# Patient Record
Sex: MALE | Race: WHITE | NOT HISPANIC OR LATINO | Employment: FULL TIME | URBAN - METROPOLITAN AREA
[De-identification: names, ages, dates, MRNs, and addresses within clinical notes are randomized per-mention and may not be internally consistent; named-entity substitution may affect disease eponyms.]

---

## 2018-08-15 ENCOUNTER — HOSPITAL ENCOUNTER (EMERGENCY)
Facility: HOSPITAL | Age: 28
Discharge: HOME | End: 2018-08-15
Attending: EMERGENCY MEDICINE | Admitting: EMERGENCY MEDICINE
Payer: COMMERCIAL

## 2018-08-15 VITALS
SYSTOLIC BLOOD PRESSURE: 119 MMHG | TEMPERATURE: 98.9 F | BODY MASS INDEX: 30.48 KG/M2 | RESPIRATION RATE: 18 BRPM | WEIGHT: 230 LBS | HEIGHT: 73 IN | OXYGEN SATURATION: 95 % | DIASTOLIC BLOOD PRESSURE: 61 MMHG | HEART RATE: 88 BPM

## 2018-08-15 DIAGNOSIS — R59.9 ENLARGED LYMPH NODE: ICD-10-CM

## 2018-08-15 DIAGNOSIS — L05.91 PILONIDAL CYST: Primary | ICD-10-CM

## 2018-08-15 PROCEDURE — 99283 EMERGENCY DEPT VISIT LOW MDM: CPT

## 2018-08-15 RX ORDER — OXYCODONE HYDROCHLORIDE AND ACETAMINOPHEN 5; 325 MG/1; MG/1
1 TABLET ORAL EVERY 4 HOURS PRN
Qty: 15 TABLET | Refills: 0 | Status: SHIPPED | OUTPATIENT
Start: 2018-08-15 | End: 2018-08-25

## 2018-08-15 RX ORDER — CLINDAMYCIN HYDROCHLORIDE 150 MG/1
300 CAPSULE ORAL EVERY 6 HOURS
Qty: 56 CAPSULE | Refills: 0 | Status: SHIPPED | OUTPATIENT
Start: 2018-08-15 | End: 2018-08-22

## 2018-08-15 RX ORDER — CLINDAMYCIN HYDROCHLORIDE 150 MG/1
300 CAPSULE ORAL ONCE
Status: COMPLETED | OUTPATIENT
Start: 2018-08-15 | End: 2018-08-15

## 2018-08-15 RX ORDER — OXYCODONE HYDROCHLORIDE AND ACETAMINOPHEN 5; 325 MG/1; MG/1
1 TABLET ORAL ONCE
Status: COMPLETED | OUTPATIENT
Start: 2018-08-15 | End: 2018-08-15

## 2018-08-15 RX ADMIN — CLINDAMYCIN HYDROCHLORIDE 300 MG: 150 CAPSULE ORAL at 20:15

## 2018-08-15 RX ADMIN — OXYCODONE HYDROCHLORIDE AND ACETAMINOPHEN 1 TABLET: 5; 325 TABLET ORAL at 20:14

## 2018-08-15 NOTE — ED PROVIDER NOTES
History  Chief Complaint   Patient presents with    Hernia     lower right hernia pelvic area sent by urgent care for evaluation  pt also cyst right between buttocks at top of buttocks     Patient referred from a local urgent care for evaluation of possible left inguinal hernia that the patient noticed today  He has pain in the left lateral region  He also complains of a recurring pilonidal cyst that also began to flare today and has started to discharge of pus he substance  He has had this cyst excised before  He denies belly pain  Denies nausea vomiting diarrhea  Denies penile or testicular pain and denies urinary complaints  Denies trauma fall or injury  Patient denies fever  He has normal appetite  None       Past Medical History:   Diagnosis Date    Abdominal pain     C  difficile colitis     2011       Past Surgical History:   Procedure Laterality Date    COLONOSCOPY      COLONOSCOPY N/A 11/29/2016    Procedure: COLONOSCOPY;  Surgeon: Tacos Anguiano MD;  Location: [facility]; Service:        History reviewed  No pertinent family history  I have reviewed and agree with the history as documented  Social History   Substance Use Topics    Smoking status: Former Smoker     Packs/day: 1 00     Types: Cigarettes    Smokeless tobacco: Never Used    Alcohol use Yes      Comment: social        Review of Systems   Constitutional: Negative  Negative for activity change, appetite change, chills, diaphoresis, fatigue and fever  HENT: Negative  Eyes: Negative  Negative for photophobia and visual disturbance  Respiratory: Negative  Negative for shortness of breath  Cardiovascular: Negative  Negative for chest pain, palpitations and leg swelling  Gastrointestinal: Positive for abdominal pain  Negative for diarrhea, nausea and vomiting  Endocrine: Negative  Genitourinary: Negative    Negative for decreased urine volume, discharge, dysuria, flank pain, frequency, genital sores, hematuria, penile pain, penile swelling, scrotal swelling, testicular pain and urgency  Musculoskeletal: Positive for back pain  Negative for neck pain and neck stiffness  Skin: Positive for wound  Negative for rash  Allergic/Immunologic: Negative  Neurological: Negative  Negative for dizziness, tremors, seizures, syncope, facial asymmetry, speech difficulty, weakness, light-headedness, numbness and headaches  Hematological: Negative  Does not bruise/bleed easily  Psychiatric/Behavioral: Negative  Negative for confusion  Physical Exam  Physical Exam   Constitutional: He is oriented to person, place, and time  He appears well-developed and well-nourished  HENT:   Head: Normocephalic and atraumatic  Eyes: EOM are normal  Pupils are equal, round, and reactive to light  Neck: Normal range of motion  Neck supple  Abdominal: Soft  Bowel sounds are normal  He exhibits no distension and no mass  There is no tenderness  There is no rebound and no guarding  No hernia  No reproducible tenderness or peritoneal signs  There is tenderness to the left lateral inguinal region and I feel a small jelly bean sized tender lymph node  No fluctuance or induration  Musculoskeletal: He exhibits tenderness  He exhibits no edema or deformity  Patient with some fluctuance and induration in the lower sacral area where he has scar tissue from prior I and D of pilonidal cyst   There is a distal leak and I can express pus from this  Neurological: He is alert and oriented to person, place, and time  He has normal reflexes  He displays normal reflexes  No cranial nerve deficit or sensory deficit  He exhibits normal muscle tone  Coordination normal    Skin: Skin is warm and dry  Psychiatric: He has a normal mood and affect  His behavior is normal  Thought content normal    Nursing note and vitals reviewed        Vital Signs  ED Triage Vitals   Temperature Pulse Respirations Blood Pressure SpO2   08/15/18 9838 08/15/18 1838 08/15/18 1838 08/15/18 1838 08/15/18 1838   98 9 °F (37 2 °C) 94 18 136/89 100 %      Temp Source Heart Rate Source Patient Position - Orthostatic VS BP Location FiO2 (%)   08/15/18 1838 08/15/18 1838 08/15/18 1838 08/15/18 1838 --   Oral Monitor Standing Left arm       Pain Score       08/15/18 1842       Worst Possible Pain           Vitals:    08/15/18 1838 08/15/18 1842 08/15/18 1945 08/15/18 1948   BP: 136/89 136/89 119/61 119/61   Pulse: 94 77 86 88   Patient Position - Orthostatic VS: Standing Sitting Sitting Lying       Visual Acuity      ED Medications  Medications   oxyCODONE-acetaminophen (PERCOCET) 5-325 mg per tablet 1 tablet (1 tablet Oral Given 8/15/18 2014)   clindamycin (CLEOCIN) capsule 300 mg (300 mg Oral Given 8/15/18 2015)       Diagnostic Studies  Results Reviewed     None                 No orders to display              Procedures  Procedures       Phone Contacts  ED Phone Contact    ED Course  ED Course as of Aug 16 1546   Wed Aug 15, 2018   2006 Patient is stable for discharge  I suspect he has a enlarged lymph node in the left lateral inguinal region that may be secondary to the active pilonidal cyst   Patient did not want me to incise and drain and preferred to wait to see surgery in follow-up  I was able to exude a great deal of pus from the existing opening  I discussed signs and symptoms requiring return to the emergency department  MDM  CritCare Time    Disposition  Final diagnoses:   Pilonidal cyst   Enlarged lymph node     Time reflects when diagnosis was documented in both MDM as applicable and the Disposition within this note     Time User Action Codes Description Comment    8/15/2018  8:09 PM Jayleen Kovacs Add [L05 91] Pilonidal cyst     8/15/2018  8:09 PM Jermaine Ramirez 54 [R59 9] Enlarged lymph node       ED Disposition     ED Disposition Condition Comment    Discharge  Lesly Rojas discharge to home/self care      Condition at discharge: Stable        Follow-up Information     Follow up With Specialties Details Why [address], [place] General Surgery Schedule an appointment as soon as possible for a visit  [address]  [address] [facility]            Discharge Medication List as of 8/15/2018  8:11 PM      START taking these medications    Details   clindamycin (CLEOCIN) 150 mg capsule Take 2 capsules (300 mg total) by mouth every 6 (six) hours for 7 days, Starting Wed 8/15/2018, Until Wed 8/22/2018, Normal      oxyCODONE-acetaminophen (PERCOCET) 5-325 mg per tablet Take 1 tablet by mouth every 4 (four) hours as needed for moderate pain for up to 10 days Max Daily Amount: 6 tablets, Starting Wed 8/15/2018, Until Sat 8/25/2018, Print           No discharge procedures on file      ED Provider  Electronically Signed by           Madisyn Underwood MD  08/16/18 7290

## 2018-08-16 NOTE — DISCHARGE INSTRUCTIONS
Lymphadenopathy   WHAT YOU NEED TO KNOW:   What is lymphadenopathy? Lymphadenopathy is swelling of your lymph nodes  Lymph nodes are small organs that are part of your immune system  Lymph nodes are found throughout your body  They are most easily felt in your neck, under your arms, and near your groin  Lymphadenopathy can occur in one or more areas of your body  What causes lymphadenopathy? Lymphadenopathy is usually caused by a bacterial, viral, or fungal infection  Other causes include autoimmune diseases (such as rheumatoid arthritis or lupus), cancer, and sarcoidosis  What are the signs and symptoms of lymphadenopathy? You may have no symptoms, or you may have any of the following:  · A painful, warm, or red lump under your skin    · More tired than usual    · Skin rash    · Unexplained weight loss    · Enlarged spleen (organ that filters blood)    · Fever or night sweats  How is lymphadenopathy diagnosed? Your healthcare provider will check your lymph node for its size and location  You may need the following tests to help healthcare providers find the cause of your lymphadenopathy:  · Blood tests  may show if you have an infection or other medical condition  · An x-ray, ultrasound, CT, or MRI  of your lymph nodes make be taken  You may be given contrast liquid to help the lymph nodes show up better in the pictures  Tell the healthcare provider if you have ever had an allergic reaction to contrast liquid  Do not enter the MRI room with anything metal  Metal can cause serious injury  Tell the healthcare provider if you have any metal in or on your body  · A lymph node biopsy  is a procedure used to remove a sample of tissue to be tested  Healthcare providers may remove lymph cells through a needle or remove one or more lymph nodes during surgery  How is lymphadenopathy treated? Your symptoms may go away without treatment   Your healthcare provider may need to treat the problem that has caused the lymph nodes to swell  Medicines may be given for infections, cancer, or other causes of your lymphadenopathy  When should I seek immediate care? · The swollen lymph nodes bleed  · You have swollen lymph nodes in your neck that affect your breathing or swallowing  When should I contact my healthcare provider? · You have a fever  · You have a new swollen and painful lymph node  · You have a skin rash  · Your lymph node remains swollen or painful, or it gets bigger  · Your lymph node has red streaks around it, or the skin around the lymph node is red  · You have questions or concerns about your condition or care  CARE AGREEMENT:   You have the right to help plan your care  Learn about your health condition and how it may be treated  Discuss treatment options with your caregivers to decide what care you want to receive  You always have the right to refuse treatment  The above information is an  only  It is not intended as medical advice for individual conditions or treatments  Talk to your doctor, nurse or pharmacist before following any medical regimen to see if it is safe and effective for you  © 2017 [address] [facility] Information is for End User's use only and may not be sold, redistributed or otherwise used for commercial purposes  All illustrations and images included in CareNotes® are the copyrighted property of [employer]  or [employer]  Pilonidal Cyst   WHAT YOU NEED TO KNOW:   What is a pilonidal cyst?  A pilonidal cyst is a small sac under the skin  Pilonidal cysts may become infected and cause an abscess (collection of pus)  What causes a pilonidal cyst?  Pilonidal cysts may be caused by an ingrown hair  A hair may become ingrown if it rubs against your skin  The friction can cause hair to dig into the skin and get trapped there     What are the signs and symptoms of a pilonidal cyst?  A pilonidal cyst may look like a small hole or dimple in the center of your lower back  It is usually located right above your buttocks  The pilonidal cyst may feel tender or painful after doing physical activities or sitting for a long period of time  The cyst may feel hot, and be red and swollen if it becomes infected  Pus may also drain from the cyst    How is a pilonidal cyst diagnosed and treated? Your healthcare provider will examine you and ask about your symptoms  You may not need any treatment  A pilonidal cyst may go away on its own  If the cyst becomes infected, the pus may need to be drained  Your healthcare provider may make a small incision to drain the pus and pack it with gauze  Your cyst may need to be surgically removed if it becomes infected often  How can I help prevent an infection? · Shave around the cyst   This will prevent hairs from entering the cyst  Your healthcare provider may recommend laser hair removal      · Clean the cyst area as directed  Your healthcare provider may recommend that you use a mild soap and rinse it well  · Do not sit for long periods of time  This may cause the cyst to get irritated  When should I contact my healthcare provider? · You have a fever  · Your cyst is red and swollen  · Your cyst has pus draining from it  · You have questions or concerns about your condition or care  CARE AGREEMENT:   You have the right to help plan your care  Learn about your health condition and how it may be treated  Discuss treatment options with your caregivers to decide what care you want to receive  You always have the right to refuse treatment  The above information is an  only  It is not intended as medical advice for individual conditions or treatments  Talk to your doctor, nurse or pharmacist before following any medical regimen to see if it is safe and effective for you    © 2017 [employer] Information is for End User's use only and may not be sold, redistributed or otherwise used for commercial purposes  All illustrations and images included in CareNotes® are the copyrighted property of A [employer]  or [employer]

## 2018-08-28 ENCOUNTER — ANESTHESIA (OUTPATIENT)
Dept: PERIOP | Facility: HOSPITAL | Age: 28
End: 2018-08-28
Payer: COMMERCIAL

## 2018-08-28 ENCOUNTER — HOSPITAL ENCOUNTER (OUTPATIENT)
Facility: HOSPITAL | Age: 28
Setting detail: OUTPATIENT SURGERY
Discharge: HOME | End: 2018-08-28
Attending: SURGERY | Admitting: SURGERY
Payer: COMMERCIAL

## 2018-08-28 ENCOUNTER — ANESTHESIA EVENT (OUTPATIENT)
Dept: PERIOP | Facility: HOSPITAL | Age: 28
End: 2018-08-28
Payer: COMMERCIAL

## 2018-08-28 VITALS
WEIGHT: 230 LBS | SYSTOLIC BLOOD PRESSURE: 119 MMHG | BODY MASS INDEX: 30.48 KG/M2 | HEART RATE: 69 BPM | HEIGHT: 73 IN | DIASTOLIC BLOOD PRESSURE: 75 MMHG | OXYGEN SATURATION: 94 % | TEMPERATURE: 100 F | RESPIRATION RATE: 18 BRPM

## 2018-08-28 DIAGNOSIS — L05.91 PILONIDAL CYST WITHOUT ABSCESS: ICD-10-CM

## 2018-08-28 PROCEDURE — 88304 TISSUE EXAM BY PATHOLOGIST: CPT | Performed by: PATHOLOGY

## 2018-08-28 RX ORDER — MIDAZOLAM HYDROCHLORIDE 1 MG/ML
INJECTION INTRAMUSCULAR; INTRAVENOUS AS NEEDED
Status: DISCONTINUED | OUTPATIENT
Start: 2018-08-28 | End: 2018-08-28 | Stop reason: SURG

## 2018-08-28 RX ORDER — SODIUM CHLORIDE, SODIUM LACTATE, POTASSIUM CHLORIDE, CALCIUM CHLORIDE 600; 310; 30; 20 MG/100ML; MG/100ML; MG/100ML; MG/100ML
125 INJECTION, SOLUTION INTRAVENOUS CONTINUOUS
Status: DISCONTINUED | OUTPATIENT
Start: 2018-08-28 | End: 2018-08-28 | Stop reason: HOSPADM

## 2018-08-28 RX ORDER — OXYCODONE HYDROCHLORIDE AND ACETAMINOPHEN 5; 325 MG/1; MG/1
2 TABLET ORAL EVERY 4 HOURS PRN
Qty: 28 TABLET | Refills: 0 | Status: SHIPPED | OUTPATIENT
Start: 2018-08-28

## 2018-08-28 RX ORDER — MORPHINE SULFATE 4 MG/ML
4 INJECTION, SOLUTION INTRAMUSCULAR; INTRAVENOUS
Status: DISCONTINUED | OUTPATIENT
Start: 2018-08-28 | End: 2018-08-28 | Stop reason: HOSPADM

## 2018-08-28 RX ORDER — FENTANYL CITRATE/PF 50 MCG/ML
25 SYRINGE (ML) INJECTION
Status: DISCONTINUED | OUTPATIENT
Start: 2018-08-28 | End: 2018-08-28 | Stop reason: HOSPADM

## 2018-08-28 RX ORDER — GLYCOPYRROLATE 0.2 MG/ML
INJECTION INTRAMUSCULAR; INTRAVENOUS AS NEEDED
Status: DISCONTINUED | OUTPATIENT
Start: 2018-08-28 | End: 2018-08-28 | Stop reason: SURG

## 2018-08-28 RX ORDER — ONDANSETRON 2 MG/ML
4 INJECTION INTRAMUSCULAR; INTRAVENOUS EVERY 4 HOURS PRN
Status: DISCONTINUED | OUTPATIENT
Start: 2018-08-28 | End: 2018-08-28 | Stop reason: HOSPADM

## 2018-08-28 RX ORDER — SUCCINYLCHOLINE/SOD CL,ISO/PF 100 MG/5ML
SYRINGE (ML) INTRAVENOUS AS NEEDED
Status: DISCONTINUED | OUTPATIENT
Start: 2018-08-28 | End: 2018-08-28 | Stop reason: SURG

## 2018-08-28 RX ORDER — PROPOFOL 10 MG/ML
INJECTION, EMULSION INTRAVENOUS AS NEEDED
Status: DISCONTINUED | OUTPATIENT
Start: 2018-08-28 | End: 2018-08-28 | Stop reason: SURG

## 2018-08-28 RX ORDER — BUPIVACAINE HYDROCHLORIDE AND EPINEPHRINE 2.5; 5 MG/ML; UG/ML
INJECTION, SOLUTION EPIDURAL; INFILTRATION; INTRACAUDAL; PERINEURAL AS NEEDED
Status: DISCONTINUED | OUTPATIENT
Start: 2018-08-28 | End: 2018-08-28 | Stop reason: HOSPADM

## 2018-08-28 RX ORDER — ONDANSETRON 2 MG/ML
INJECTION INTRAMUSCULAR; INTRAVENOUS AS NEEDED
Status: DISCONTINUED | OUTPATIENT
Start: 2018-08-28 | End: 2018-08-28 | Stop reason: SURG

## 2018-08-28 RX ORDER — KETOROLAC TROMETHAMINE 30 MG/ML
INJECTION, SOLUTION INTRAMUSCULAR; INTRAVENOUS AS NEEDED
Status: DISCONTINUED | OUTPATIENT
Start: 2018-08-28 | End: 2018-08-28 | Stop reason: SURG

## 2018-08-28 RX ORDER — CEPHALEXIN 500 MG/1
500 CAPSULE ORAL EVERY 8 HOURS SCHEDULED
COMMUNITY

## 2018-08-28 RX ORDER — ONDANSETRON 2 MG/ML
4 INJECTION INTRAMUSCULAR; INTRAVENOUS ONCE AS NEEDED
Status: DISCONTINUED | OUTPATIENT
Start: 2018-08-28 | End: 2018-08-28 | Stop reason: HOSPADM

## 2018-08-28 RX ORDER — OXYCODONE HYDROCHLORIDE AND ACETAMINOPHEN 5; 325 MG/1; MG/1
2 TABLET ORAL EVERY 4 HOURS PRN
Status: DISCONTINUED | OUTPATIENT
Start: 2018-08-28 | End: 2018-08-28 | Stop reason: HOSPADM

## 2018-08-28 RX ORDER — FENTANYL CITRATE 50 UG/ML
INJECTION, SOLUTION INTRAMUSCULAR; INTRAVENOUS AS NEEDED
Status: DISCONTINUED | OUTPATIENT
Start: 2018-08-28 | End: 2018-08-28 | Stop reason: SURG

## 2018-08-28 RX ADMIN — PROPOFOL 200 MG: 10 INJECTION, EMULSION INTRAVENOUS at 10:54

## 2018-08-28 RX ADMIN — DEXAMETHASONE SODIUM PHOSPHATE 10 MG: 10 INJECTION INTRAMUSCULAR; INTRAVENOUS at 10:54

## 2018-08-28 RX ADMIN — SODIUM CHLORIDE, SODIUM LACTATE, POTASSIUM CHLORIDE, AND CALCIUM CHLORIDE 125 ML/HR: .6; .31; .03; .02 INJECTION, SOLUTION INTRAVENOUS at 09:15

## 2018-08-28 RX ADMIN — SODIUM CHLORIDE, SODIUM LACTATE, POTASSIUM CHLORIDE, AND CALCIUM CHLORIDE: .6; .31; .03; .02 INJECTION, SOLUTION INTRAVENOUS at 10:13

## 2018-08-28 RX ADMIN — PROPOFOL 50 MG: 10 INJECTION, EMULSION INTRAVENOUS at 11:00

## 2018-08-28 RX ADMIN — FENTANYL CITRATE 25 MCG: 50 INJECTION INTRAMUSCULAR; INTRAVENOUS at 12:30

## 2018-08-28 RX ADMIN — GLYCOPYRROLATE 0.2 MG: 0.2 INJECTION, SOLUTION INTRAMUSCULAR; INTRAVENOUS at 10:52

## 2018-08-28 RX ADMIN — ONDANSETRON 4 MG: 2 INJECTION INTRAMUSCULAR; INTRAVENOUS at 10:54

## 2018-08-28 RX ADMIN — FENTANYL CITRATE 25 MCG: 50 INJECTION INTRAMUSCULAR; INTRAVENOUS at 12:24

## 2018-08-28 RX ADMIN — CEFAZOLIN SODIUM 2000 MG: 2 SOLUTION INTRAVENOUS at 11:02

## 2018-08-28 RX ADMIN — FENTANYL CITRATE 50 MCG: 50 INJECTION, SOLUTION INTRAMUSCULAR; INTRAVENOUS at 10:51

## 2018-08-28 RX ADMIN — FENTANYL CITRATE 50 MCG: 50 INJECTION, SOLUTION INTRAMUSCULAR; INTRAVENOUS at 11:00

## 2018-08-28 RX ADMIN — Medication 100 MG: at 10:54

## 2018-08-28 RX ADMIN — MIDAZOLAM 2 MG: 1 INJECTION INTRAMUSCULAR; INTRAVENOUS at 10:51

## 2018-08-28 RX ADMIN — OXYCODONE HYDROCHLORIDE AND ACETAMINOPHEN 2 TABLET: 5; 325 TABLET ORAL at 13:01

## 2018-08-28 RX ADMIN — FENTANYL CITRATE 25 MCG: 50 INJECTION INTRAMUSCULAR; INTRAVENOUS at 12:18

## 2018-08-28 RX ADMIN — KETOROLAC TROMETHAMINE 30 MG: 30 INJECTION, SOLUTION INTRAMUSCULAR at 11:27

## 2018-08-28 RX ADMIN — LIDOCAINE HYDROCHLORIDE 50 MG: 20 INJECTION, SOLUTION INTRAVENOUS at 10:54

## 2018-08-28 NOTE — ANESTHESIA PREPROCEDURE EVALUATION
Review of Systems/Medical History      No history of anesthetic complications     Cardiovascular  Negative cardio ROS Exercise tolerance (METS): >4,     Pulmonary  Negative pulmonary ROS        GI/Hepatic            Endo/Other    Obesity    GYN       Hematology   Musculoskeletal       Neurology   Psychology           Physical Exam    Airway    Mallampati score: I  TM Distance: >3 FB  Neck ROM: full     Dental       Cardiovascular  Comment: Negative ROS, Cardiovascular exam normal    Pulmonary  Breath sounds clear to auscultation,     Other Findings        Anesthesia Plan  ASA Score- 1     Anesthesia Type- general with ASA Monitors  Additional Monitors:   Airway Plan: ETT  Plan Factors-    Induction- intravenous  Postoperative Plan- Plan for postoperative opioid use  Informed Consent- Anesthetic plan and risks discussed with patient  I personally reviewed this patient with the CRNA  Discussed and agreed on the Anesthesia Plan with the CRNA  James Trevizo

## 2018-08-28 NOTE — OP NOTE
OPERATIVE REPORT  PATIENT NAME: Elbert Narayanan    :  1990  MRN: [id number]  Pt Location:  OR ROOM 05    SURGERY DATE: 2018    Surgeon(s) and Role:     * Juarez Gómez DO - Primary     * Wendy Ovalle MD - Assisting    Preop Diagnosis:  Pilonidal cyst without abscess [L05 91]    Post-Op Diagnosis Codes:     * Pilonidal cyst without abscess [L05 ]    Procedure(s) (LRB):  EXCISION PILONIDAL CYST (N/A)    Specimen(s):  ID Type Source Tests Collected by Time Destination   1 : pilonidal cyst Tissue Pilonidal Cyst/Sinus TISSUE EXAM Juarez Gómez DO 2018 1113        Estimated Blood Loss:   Minimal    Drains:       Anesthesia Type:   General/local    Operative Indications:  Pilonidal cyst without abscess [L05 ]      Operative Findings:    Review of Systems/Medical History  No history of anesthetic complications     Cardiovascular  Negative cardio ROS Exercise tolerance (METS): >4,   Pulmonary  Negative pulmonary ROS       GI/Hepatic          Endo/Other  Obesity     GYN      Hematology Musculoskeletal      Neurology Psychology      Height 73 in weight 104 kg/230 lb BMI 30  ASA 2  Wound class 2  Complications:   None    Procedure and Technique:  Patient was brought into the operative suite on the stretcher  He was placed under general anesthesia on the stretcher after placement of a thrombotic pumps  Once under anesthesia was then placed prone on the OR table  He was given perioperative antibiotics  Buttocks were taped apart to expose pilonidal cyst   This areas prepped and draped in a sterile fashion with Betadine  Time-out was performed  Local was instilled the skin surrounding tissues of his pilonidal cyst opening and a granular drainage tract  A crescent shaped elliptical incision was made to encompass both the opening as well as his pilonidal cyst area  This was taken down to the deeper subcutaneous tissues just above the sacrum  It was removed in its entirety    Did undermine towards the right side of the incision to help provide closure  Irrigation is carried out local was instilled deeper subcutaneous tissues were reapproximated 0 Vicryl suture  Superficial subcutaneous tissues were reapproximated in 2 0 Vicryl  Skin was closed using 3 0 nylon in a mattress fashion  Subcuticular closure of the skin was then performed with 4 Monocryl  Wound was washed and dried  Sterile histoacryl was applied  Once dry did place dressings to help provide padding  He was awakened in the operating returned to the recovery area in stable condition having tolerated procedure well     I was present for the entire procedure    Patient Disposition:  PACU     SIGNATURE: Alva Roque DO  DATE: August 28, 2018  TIME: 11:48 AM

## 2018-08-28 NOTE — DISCHARGE INSTRUCTIONS
Ice as needed to wound    May shower or bathe daily  Encourage ambulation  Okay to climb stairs    Recommend no driving for 5 days    May change dressing as needed     Change dressing daily if he noticed drainage  Call Dr Khanh Gibbons with questions or concerns    [phone]    Call office to be seen in approximately 2 weeks

## 2021-10-16 NOTE — ANESTHESIA POSTPROCEDURE EVALUATION
Post-Op Assessment Note      CV Status:  Stable    Mental Status:  Alert and awake    Hydration Status:  Euvolemic    PONV Controlled:  Controlled    Airway Patency:  Patent    Post Op Vitals Reviewed: Yes          Staff: CRNA           BP   161/69   Temp   98 6   Pulse  95   Resp   16   SpO2   94 Fellow

## 2024-05-14 ENCOUNTER — HOSPITAL ENCOUNTER (EMERGENCY)
Facility: HOSPITAL | Age: 34
Discharge: HOME | End: 2024-05-14
Attending: EMERGENCY MEDICINE
Payer: COMMERCIAL

## 2024-05-14 ENCOUNTER — APPOINTMENT (EMERGENCY)
Dept: CT IMAGING | Facility: HOSPITAL | Age: 34
End: 2024-05-14
Payer: COMMERCIAL

## 2024-05-14 VITALS
DIASTOLIC BLOOD PRESSURE: 111 MMHG | TEMPERATURE: 98 F | RESPIRATION RATE: 18 BRPM | HEART RATE: 85 BPM | SYSTOLIC BLOOD PRESSURE: 158 MMHG | OXYGEN SATURATION: 96 %

## 2024-05-14 DIAGNOSIS — R93.0 ABNORMAL CT OF THE HEAD: ICD-10-CM

## 2024-05-14 DIAGNOSIS — R51.9 ACUTE HEADACHE: Primary | ICD-10-CM

## 2024-05-14 LAB
ANION GAP SERPL CALCULATED.3IONS-SCNC: 4 MMOL/L (ref 4–13)
BUN SERPL-MCNC: 11 MG/DL (ref 5–25)
CALCIUM SERPL-MCNC: 9.2 MG/DL (ref 8.4–10.2)
CHLORIDE SERPL-SCNC: 106 MMOL/L (ref 96–108)
CO2 SERPL-SCNC: 29 MMOL/L (ref 21–32)
CREAT SERPL-MCNC: 0.91 MG/DL (ref 0.6–1.3)
GFR SERPL CREATININE-BSD FRML MDRD: 108 ML/MIN/1.73SQ M
GLUCOSE SERPL-MCNC: 89 MG/DL (ref 65–140)
POTASSIUM SERPL-SCNC: 4.3 MMOL/L (ref 3.5–5.3)
SODIUM SERPL-SCNC: 139 MMOL/L (ref 135–147)

## 2024-05-14 PROCEDURE — 80048 BASIC METABOLIC PNL TOTAL CA: CPT | Performed by: EMERGENCY MEDICINE

## 2024-05-14 PROCEDURE — 96375 TX/PRO/DX INJ NEW DRUG ADDON: CPT

## 2024-05-14 PROCEDURE — 36415 COLL VENOUS BLD VENIPUNCTURE: CPT | Performed by: EMERGENCY MEDICINE

## 2024-05-14 PROCEDURE — 70498 CT ANGIOGRAPHY NECK: CPT

## 2024-05-14 PROCEDURE — 99285 EMERGENCY DEPT VISIT HI MDM: CPT | Performed by: EMERGENCY MEDICINE

## 2024-05-14 PROCEDURE — 96365 THER/PROPH/DIAG IV INF INIT: CPT

## 2024-05-14 PROCEDURE — 70496 CT ANGIOGRAPHY HEAD: CPT

## 2024-05-14 PROCEDURE — 99284 EMERGENCY DEPT VISIT MOD MDM: CPT

## 2024-05-14 RX ORDER — DIPHENHYDRAMINE HYDROCHLORIDE 50 MG/ML
25 INJECTION INTRAMUSCULAR; INTRAVENOUS ONCE
Status: COMPLETED | OUTPATIENT
Start: 2024-05-14 | End: 2024-05-14

## 2024-05-14 RX ORDER — KETOROLAC TROMETHAMINE 30 MG/ML
15 INJECTION, SOLUTION INTRAMUSCULAR; INTRAVENOUS ONCE
Status: COMPLETED | OUTPATIENT
Start: 2024-05-14 | End: 2024-05-14

## 2024-05-14 RX ORDER — METOCLOPRAMIDE HYDROCHLORIDE 5 MG/ML
10 INJECTION INTRAMUSCULAR; INTRAVENOUS ONCE
Status: COMPLETED | OUTPATIENT
Start: 2024-05-14 | End: 2024-05-14

## 2024-05-14 RX ORDER — ACETAMINOPHEN 325 MG/1
975 TABLET ORAL ONCE
Status: COMPLETED | OUTPATIENT
Start: 2024-05-14 | End: 2024-05-14

## 2024-05-14 RX ORDER — MAGNESIUM SULFATE HEPTAHYDRATE 40 MG/ML
2 INJECTION, SOLUTION INTRAVENOUS ONCE
Status: COMPLETED | OUTPATIENT
Start: 2024-05-14 | End: 2024-05-14

## 2024-05-14 RX ADMIN — ACETAMINOPHEN 975 MG: 325 TABLET, FILM COATED ORAL at 13:47

## 2024-05-14 RX ADMIN — SODIUM CHLORIDE 1000 ML: 0.9 INJECTION, SOLUTION INTRAVENOUS at 13:51

## 2024-05-14 RX ADMIN — DIPHENHYDRAMINE HYDROCHLORIDE 25 MG: 50 INJECTION, SOLUTION INTRAMUSCULAR; INTRAVENOUS at 13:47

## 2024-05-14 RX ADMIN — METOCLOPRAMIDE 10 MG: 5 INJECTION, SOLUTION INTRAMUSCULAR; INTRAVENOUS at 13:50

## 2024-05-14 RX ADMIN — KETOROLAC TROMETHAMINE 15 MG: 30 INJECTION, SOLUTION INTRAMUSCULAR; INTRAVENOUS at 13:49

## 2024-05-14 RX ADMIN — IOHEXOL 85 ML: 350 INJECTION, SOLUTION INTRAVENOUS at 14:25

## 2024-05-14 RX ADMIN — MAGNESIUM SULFATE HEPTAHYDRATE 2 G: 40 INJECTION, SOLUTION INTRAVENOUS at 14:06

## 2024-05-14 NOTE — Clinical Note
Vega Troy was seen and treated in our emergency department on 5/14/2024.                Diagnosis: headache    Vega  may return to work on return date.    He may return on this date: 05/15/2024         If you have any questions or concerns, please don't hesitate to call.      Nicola Busch MD    ______________________________           _______________          _______________  Hospital Representative                              Date                                Time

## 2024-05-14 NOTE — ED PROVIDER NOTES
"History  Chief Complaint   Patient presents with    Headache     Patient reports straining to make a bowel movement this past Friday and felt \"a pop\" in their head and was immediately followed by a frontal migraine. Patient reports no hx of migraines, reports pain has been intermittent all weekend with increase in pain again last night. Family denies any slurred speech or facial drooping. [place] scale (-).      34-year-old male no significant reported past history presenting with headache.  Patient reports he was having some abdominal pain on Friday went to have a bowel movement.  While straining to have a bowel movement he felt a popping sensation in the front of his head associated with sharp significant headache.  Reports intermittent nausea without vomiting.  Reports some photophobia without visual changes such as blurred vision or double vision.  Reports waxing waning headache since that time.  Here today due to worsening last night.  Not maximal in onset not the worst headache of his life.  Denies any chest pain shortness of breath.  Denies any neurological changes such as motor or sensory deficits.  Denies any other complaints.  Chart reviewed.    Past Medical History:  No date: Abdominal pain  No date: C. difficile colitis      Comment:  2011  No date: Pilonidal cyst  Family History: non-contributory  Social History          Prior to Admission Medications   Prescriptions Last Dose Informant Patient Reported? Taking?   cephalexin (KEFLEX) 500 mg capsule   Yes No   Sig: Take 500 mg by mouth every 8 (eight) hours   oxyCODONE-acetaminophen (PERCOCET) 5-325 mg per tablet   No No   Sig: Take 2 tablets by mouth every 4 (four) hours as needed for moderate pain for up to 20 doses Max Daily Amount: 12 tablets      Facility-Administered Medications: None       Past Medical History:   Diagnosis Date    Abdominal pain     C. difficile colitis     2011    Pilonidal cyst        Past Surgical History:   Procedure " Laterality Date    COLONOSCOPY N/A 11/29/2016    Procedure: COLONOSCOPY;  Surgeon: Mono Engel MD;  Location: [facility];  Service:     OCCULT BLOOD, FECAL IMMUNOCHEMICAL (HISTORICAL)      MI EXCISION PILONIDAL CYST/SINUS SIMPLE N/A 8/28/2018    Procedure: EXCISION PILONIDAL CYST;  Surgeon: Daniel Aparicio DO;  Location: [facility];  Service: General       Family History   Problem Relation Age of Onset    Asthma Mother     Arthritis Mother     Asthma Paternal Grandmother     Arthritis Paternal Grandmother     Alzheimer's disease Family      I have reviewed and agree with the history as documented.    E-Cigarette/Vaping    E-Cigarette Use Never User      E-Cigarette/Vaping Substances     Social History     Tobacco Use    Smoking status: Former     Current packs/day: 1.00     Types: Cigarettes    Smokeless tobacco: Never   Vaping Use    Vaping status: Never Used   Substance Use Topics    Alcohol use: Yes     Comment: social    Drug use: No       Review of Systems   Constitutional:  Negative for appetite change, chills, diaphoresis, fever and unexpected weight change.   HENT:  Negative for congestion and rhinorrhea.    Eyes:  Negative for photophobia and visual disturbance.   Respiratory:  Negative for cough, chest tightness and shortness of breath.    Cardiovascular:  Negative for chest pain, palpitations and leg swelling.   Gastrointestinal:  Negative for abdominal distention, abdominal pain, blood in stool, constipation, diarrhea, nausea and vomiting.   Genitourinary:  Negative for dysuria and hematuria.   Musculoskeletal:  Negative for back pain, joint swelling, neck pain and neck stiffness.   Skin:  Negative for color change, pallor, rash and wound.   Neurological:  Positive for headaches. Negative for dizziness, syncope, weakness and light-headedness.   Psychiatric/Behavioral:  Negative for agitation.    All other systems reviewed and are negative.      Physical Exam  Physical Exam  Vitals and nursing note  reviewed.   Constitutional:       General: He is not in acute distress.     Appearance: Normal appearance. He is well-developed. He is not ill-appearing, toxic-appearing or diaphoretic.   HENT:      Head: Normocephalic and atraumatic.      Nose: Nose normal. No congestion or rhinorrhea.      Mouth/Throat:      Mouth: Mucous membranes are moist.      Pharynx: Oropharynx is clear. No oropharyngeal exudate or posterior oropharyngeal erythema.   Eyes:      General: No scleral icterus.        Right eye: No discharge.         Left eye: No discharge.      Extraocular Movements: Extraocular movements intact.      Conjunctiva/sclera: Conjunctivae normal.      Pupils: Pupils are equal, round, and reactive to light.   Neck:      Vascular: No JVD.      Trachea: No tracheal deviation.      Comments: Supple. Normal range of motion.   Cardiovascular:      Rate and Rhythm: Normal rate and regular rhythm.      Heart sounds: Normal heart sounds. No murmur heard.     No friction rub. No gallop.      Comments: Normal rate and regular rhythm  Pulmonary:      Effort: Pulmonary effort is normal. No respiratory distress.      Breath sounds: Normal breath sounds. No stridor. No wheezing or rales.      Comments: Clear to auscultation bilaterally  Chest:      Chest wall: No tenderness.   Abdominal:      General: Bowel sounds are normal. There is no distension.      Palpations: Abdomen is soft.      Tenderness: There is no abdominal tenderness. There is no right CVA tenderness, left CVA tenderness, guarding or rebound.      Comments: Soft, nontender, nondistended.  Normal bowel sounds throughout   Musculoskeletal:         General: No swelling, tenderness, deformity or signs of injury. Normal range of motion.      Cervical back: Normal range of motion and neck supple. No rigidity or tenderness. No muscular tenderness.      Right lower leg: No edema.      Left lower leg: No edema.   Lymphadenopathy:      Cervical: No cervical adenopathy.    Skin:     General: Skin is warm and dry.      Coloration: Skin is not pale.      Findings: No erythema or rash.   Neurological:      General: No focal deficit present.      Mental Status: He is alert and oriented to person, place, and time. Mental status is at baseline.      Cranial Nerves: No cranial nerve deficit.      Sensory: No sensory deficit.      Motor: No weakness or abnormal muscle tone.      Coordination: Coordination normal.      Gait: Gait normal.      Comments: A&Ox3 to person, place, and time. CN 2-12 intact. Strength 5/5 throughout. Sensation intact throughout. Cerebellar exam including gait intact.   Psychiatric:         Behavior: Behavior normal.         Thought Content: Thought content normal.         Vital Signs  ED Triage Vitals [05/14/24 1126]   Temperature Pulse Respirations Blood Pressure SpO2   98 °F (36.7 °C) 85 18 (!) 158/111 96 %      Temp Source Heart Rate Source Patient Position - Orthostatic VS BP Location FiO2 (%)   Temporal Monitor Sitting Left arm --      Pain Score       3           Vitals:    05/14/24 1126   BP: (!) 158/111   Pulse: 85   Patient Position - Orthostatic VS: Sitting         Visual Acuity  Visual Acuity      Flowsheet Row Most Recent Value   L Pupil Size (mm) 3   R Pupil Size (mm) 3            ED Medications  Medications   sodium chloride 0.9 % bolus 1,000 mL (0 mL Intravenous Stopped 5/14/24 1515)   acetaminophen (TYLENOL) tablet 975 mg (975 mg Oral Given 5/14/24 1347)   ketorolac (TORADOL) injection 15 mg (15 mg Intravenous Given 5/14/24 1349)   metoclopramide (REGLAN) injection 10 mg (10 mg Intravenous Given 5/14/24 1350)   diphenhydrAMINE (BENADRYL) injection 25 mg (25 mg Intravenous Given 5/14/24 1347)   magnesium sulfate 2 g/50 mL IVPB (premix) 2 g (0 g Intravenous Stopped 5/14/24 1450)   iohexol (OMNIPAQUE) 350 MG/ML injection (MULTI-DOSE) 85 mL (85 mL Intravenous Given 5/14/24 1425)       Diagnostic Studies  Results Reviewed       Procedure Component Value  Units Date/Time    Basic metabolic panel [[id number]] Collected: 05/14/24 1343    Lab Status: Final result Specimen: Blood from Arm, Right Updated: 05/14/24 1401     Sodium 139 mmol/L      Potassium 4.3 mmol/L      Chloride 106 mmol/L      CO2 29 mmol/L      ANION GAP 4 mmol/L      BUN 11 mg/dL      Creatinine 0.91 mg/dL      Glucose 89 mg/dL      Calcium 9.2 mg/dL      eGFR 108 ml/min/1.73sq m     Narrative:      National Kid[employer] Disease Foundation guidelines for Chronic Kidney Disease (CKD):     Stage 1 with normal or high GFR (GFR > 90 mL/min/1.73 square meters)    Stage 2 Mild CKD (GFR = 60-89 mL/min/1.73 square meters)    Stage 3A Moderate CKD (GFR = 45-59 mL/min/1.73 square meters)    Stage 3B Moderate CKD (GFR = 30-44 mL/min/1.73 square meters)    Stage 4 Severe CKD (GFR = 15-29 mL/min/1.73 square meters)    Stage 5 End Stage CKD (GFR <15 mL/min/1.73 square meters)  Note: GFR calculation is accurate only with a steady state creatinine                   CTA head and neck with and without contrast   Final Result by Ulisses Cardenas MD (05/14 1522)      CT head:   -Hypodensity within the right thalamus (2:23), nonspecific and may represent age-indeterminate infarct. Recommend MRI of the brain for further evaluation.      CTA head:   -No large vessel occlusion, high-grade stenosis.   -Fusiform aneurysmal dilatation of the distal right A2 segment measuring 2 mm in greatest axial dimension. Recommend consultation with the [facility] of [facility] at [phone].   -Short segment multifocal mild luminal narrowing of the distal right A2 segment, nonspecific. Differential includes vasculitis, vasospasm among other etiologies.      CTA neck:   -No high-grade stenosis, dissection or aneurysm.      Other:   -Bilateral level 2 lymphadenopathy measuring up to 1 cm. Findings may be reactive. Clinical correlation advised.         The study was marked in EPIC for immediate  notification.                     Workstation performed: [id number]                    Procedures  Procedures         ED Course                                             Medical Decision Making  34-year-old male no significant reported past history presenting with headache.  Headache after straining for bowel movement.  Intact neuroexam.  Plan for CT imaging.  Symptom management with oral and IV pain and nausea medication plus fluids.  Reassess.    Labs interpreted by me without significant acute process.  Headache resolved.  CT no significant acute process.  Notable for small aneurysm and nonspecific colonic lesion.  Advised patient of results and need for follow-up imaging including MRI.  Headache precautions. Discussed results and recommendations. Advised follow up PCP. Medication recommendations. Given instructions and return precautions. Patient/family at bedside acknowledged understanding of all written and verbal instructions and return precautions. Discharged.     Amount and/or Complexity of Data Reviewed  Labs: ordered.  Radiology: ordered.    Risk  OTC drugs.  Prescription drug management.             Disposition  Final diagnoses:   Acute headache   Abnormal CT of the head     Time reflects when diagnosis was documented in both MDM as applicable and the Disposition within this note       Time User Action Codes Description Comment    5/14/2024  2:38 PM Nicola Busch Add [R51.9] Acute headache     5/14/2024  3:27 PM Nicola Busch Add [R93.0] Abnormal CT of the head           ED Disposition       ED Disposition   Discharge    Condition   Stable    Date/Time   Tue May 14, 2024  3:26 PM    Comment   [place] discharge to home/self care.                   Follow-up Information       Follow up With Specialties Details Why Contact Info Additional Information    Richar Maclolm, DO Family Medicine Schedule an appointment as soon as possible for a visit in 1 week  [address] [facility]  [place]  [address]  [phone]       Phil Jurado MD Neurosurgery, Radiology Schedule an appointment as soon as possible for a visit in 1 week  [address] [place] [id number]  [phone]       [facility] Neurosurgery Schedule an appointment as soon as possible for a visit in 1 week  [address] [place] [id number] [phone] [facility], [address] [place] [address], [id number] [phone]            Patient's Medications   Discharge Prescriptions    No medications on file       No discharge procedures on file.    PDMP Review       None            ED Provider  Electronically Signed by             Nicola Busch MD  05/14/24 6803

## 2024-05-14 NOTE — DISCHARGE INSTRUCTIONS
Please follow up PCP. Recommend tylenol 650 mg and ibuprofen 600 mg every 6 hours as needed for pain. Please return for severe chest pain, significant shortness of breath, severely worsening symptoms, or any other concerning signs or symptoms. Please refer to the following documents for additional instructions and return precautions.     -Hypodensity within the right thalamus (2:23), nonspecific and may represent age-indeterminate infarct. Recommend MRI of the brain for further evaluation.    -Fusiform aneurysmal dilatation of the distal right A2 segment measuring 2 mm in greatest axial dimension.  Recommend follow-up neurovascular center.   Detail Level: Detailed Render Risk Assessment In Note?: yes Additional Notes: Plan for 1 syringe [place]ollure

## 2024-05-15 ENCOUNTER — TELEPHONE (OUTPATIENT)
Dept: NEUROSURGERY | Facility: CLINIC | Age: 34
End: 2024-05-15

## 2024-05-15 NOTE — TELEPHONE ENCOUNTER
5/15/24 - PT CALLED TO SCHED APPT ABNORMAL CTA HEAD & NECK 5/14/24 SL    REF BY [facility] - ACUTE HEADACHE     INFORMED PT AN  WILL BE IN CONTACT HIM EXPLAINED PROCESS

## 2024-05-20 ENCOUNTER — CONSULT (OUTPATIENT)
Dept: NEUROSURGERY | Facility: CLINIC | Age: 34
End: 2024-05-20
Payer: COMMERCIAL

## 2024-05-20 VITALS
SYSTOLIC BLOOD PRESSURE: 150 MMHG | OXYGEN SATURATION: 96 % | DIASTOLIC BLOOD PRESSURE: 90 MMHG | TEMPERATURE: 98.9 F | HEART RATE: 86 BPM | WEIGHT: 260 LBS | BODY MASS INDEX: 34.46 KG/M2 | RESPIRATION RATE: 16 BRPM | HEIGHT: 73 IN

## 2024-05-20 DIAGNOSIS — R93.89 ABNORMAL COMPUTED TOMOGRAPHY ANGIOGRAPHY (CTA): ICD-10-CM

## 2024-05-20 DIAGNOSIS — R25.1 TREMORS OF NERVOUS SYSTEM: Primary | ICD-10-CM

## 2024-05-20 DIAGNOSIS — I67.1 CEREBRAL ANEURYSM, NONRUPTURED: ICD-10-CM

## 2024-05-20 PROCEDURE — 99203 OFFICE O/P NEW LOW 30 MIN: CPT | Performed by: PHYSICIAN ASSISTANT

## 2024-05-20 NOTE — ASSESSMENT & PLAN NOTE
Tremors since young age  Reports evaluation with Gutierrez as a child  No records available per patient   Reports worsening of tremors since onset of severe headache over 1 week ago.

## 2024-05-20 NOTE — PROGRESS NOTES
Neurosurgery Office Note  Vega Troy 34 y.o. male MRN: [id number]      Assessment & Plan     Cerebral aneurysm, nonruptured  Presents for new patient consultation regarding an incidentally noted cerebral aneurysm  Patient had ER presentation on 5/14 with severe sudden onset headache    Imaging:   CTA 5/14/2024: Fusiform aneurysmal dilation of the right A2 segment measuring approximately 2 mm.  No evidence of saccular berry aneurysm.  No significant stenosis or occlusion.  Hypodensity within the right thalamus nonspecific and may represent age-indeterminate infarct.    Plan:   Continue to monitor symptoms   Reviewed imaging with patient in room  Extensively discussed natural history of aneurysms.   Discussed that based on ISUIA she has a < 1%/ 5yr risk of aneurysm rupture.   Discussed modifiable risk factors including hypertension, and smoking.   Discussed signs and symptoms of aneurysm rupture including severe, sudden onset headache, neck pain, nausea and vomiting, and seizure.   Reiterated that these symptoms should prompt her to visit an emergency department immediately.  Continue taking OTC pain medication for headache management as needed  Continue with magnesium and vitamin B supplements   Continue to be active with walking and low impact activities   Will place referral to outpatient neurology  Possible stroke in R thalamus, headaches management and evaluation/work up for tremors  MRI brain with and without contrast for evaluation of R thalamic hypodensity  Do not anticipate intervention will be required. If infarct noted, this can be followed up and addressed with neurology.   CTA head with and without contrast ordered for follow up of aneurysm dilation in 6 months  If stable consider prn follow up as no intervention will likely be required.   Follow up in 6 months with repeat CTA. Encouraged to call with questions or concerns      Tremors of nervous system  Tremors since young age  Reports evaluation with  [facility] as a child  No records available per patient   Reports worsening of tremors since onset of severe headache over 1 week ago.    Abnormal computed tomography angiography (CTA)  CTA showed 2 mm aneurysmal dilation of the right A2 segment as well as hypodensity in the right thalamus  Radiology recommended MRI brain for further evaluation  MRI with and without contrast placed at this time.       Diagnoses and all orders for this visit:    Tremors of nervous system  -     Ambulatory referral to Neurology; Future    Abnormal computed tomography angiography (CTA)  -     Ambulatory Referral to Neurosurgery  -     MRI brain w wo contrast; Future  -     CTA head w wo contrast; Future  -     Ambulatory referral to Neurology; Future    Cerebral aneurysm, nonruptured    Other orders  -     Aspirin-Acetaminophen-Caffeine (EXCEDRIN MIGRAINE PO); Take by mouth 3 (three) times a day  -     MAGNESIUM PO; Take 400 mg by mouth every morning          I have spent a total time of 30 minutes on 05/20/24 in caring for this patient including Diagnostic results, Prognosis, Risks and benefits of tx options, Instructions for management, Patient and family education, Importance of tx compliance, Risk factor reductions, Impressions, Counseling / Coordination of care, Documenting in the medical record, and Reviewing / ordering tests, medicine, procedures  .      CHIEF COMPLAINT    Chief Complaint   Patient presents with    Consult     Abnormal CTA       HISTORY    History of Present Illness     34 y.o. year old male who presents the outpatient neurosurgical office as a new patient consultation for evaluation of his abnormal CTA.  Patient had an original onset of his severe headache on 5/10 after he was attempting to have a bowel movement with some straining.  He described the headache as acute rapid onset and severe in intensity.  He was assisted off the toilet by his wife after administration of some medications the severe intensity  diminished to moderate and he was able to get some rest.  He states over the next 3 days it continued with a moderate intensity without any resolution.  Unfortunately Monday evening he had recurrence of his severe nature without any exacerbating incident.  This prompted his ED presentation on 5/14.  A CTA was completed which showed 2 mm fusiform dilation of the right P2 segment without any other evidence of intracranial aneurysm.  His CT at that time did not show any evidence of hemorrhage.  He was discharged after medical therapies and was provided a referral by the ED for follow-up for his cerebral aneurysm.  At this time patient continues to complain of mild persistent headaches although he has not had any recurrence of his severe head pain.  He denies any tingling numbness or radiating pains.  He denies any weakness.  He does have some associated ringing in the ears as well as tremors.  He states the tremors were present prior to the onset of his headache and have been present since his childhood.  He did undergo evaluation as a child at [facility] but states these records are not available for him.  Periodically he does report some visual floaters but states this was also present prior to the onset of his headache.  He has been taking Excedrin Migraine and magnesium for symptomatic control.  Patient is a former smoker now using smokeless tobacco.  Per chart review patient does still use oral tobacco products.         See Discussion    REVIEW OF SYSTEMS    Review of Systems   HENT:  Positive for tinnitus (infrequent, not new). Negative for trouble swallowing.    Eyes:  Positive for visual disturbance (while working on computer).   Gastrointestinal: Negative.    Genitourinary: Negative.    Musculoskeletal:  Negative for gait problem.        Low back pressure   Neurological:  Positive for dizziness (with visual disturbances), tremors, numbness (tingling/cold fingertips/toes sometimes) and headaches. Negative for  seizures and weakness.        Involuntary head and neck twitching   Psychiatric/Behavioral:  Positive for decreased concentration (with HA). Negative for confusion.        ROS obtained by MA. Reviewed. See HPI.     Meds/Allergies     Current Outpatient Medications   Medication Sig Dispense Refill    Aspirin-Acetaminophen-Caffeine (EXCEDRIN MIGRAINE PO) Take by mouth 3 (three) times a day      MAGNESIUM PO Take 400 mg by mouth every morning      cephalexin (KEFLEX) 500 mg capsule Take 500 mg by mouth every 8 (eight) hours (Patient not taking: Reported on 5/20/2024)      oxyCODONE-acetaminophen (PERCOCET) 5-325 mg per tablet Take 2 tablets by mouth every 4 (four) hours as needed for moderate pain for up to 20 doses Max Daily Amount: 12 tablets (Patient not taking: Reported on 5/20/2024) 28 tablet 0     No current facility-administered medications for this visit.       No Known Allergies    PAST HISTORY    Past Medical History:   Diagnosis Date    Abdominal pain     C. difficile colitis     2011    Pilonidal cyst        Past Surgical History:   Procedure Laterality Date    COLONOSCOPY N/A 11/29/2016    Procedure: COLONOSCOPY;  Surgeon: Mono Engel MD;  Location: [facility];  Service:     OCCULT BLOOD, FECAL IMMUNOCHEMICAL (HISTORICAL)      IA EXCISION PILONIDAL CYST/SINUS SIMPLE N/A 8/28/2018    Procedure: EXCISION PILONIDAL CYST;  Surgeon: Daniel Aparicio DO;  Location: [facility];  Service: General       Social History     Tobacco Use    Smoking status: Former     Average packs/day: 1 pack/day for 17.0 years (17.0 ttl pk-yrs)     Types: Cigarettes     Start date: 2001    Smokeless tobacco: Current     Types: Snuff    Tobacco comments:     Nicotine packs   Vaping Use    Vaping status: Never Used   Substance Use Topics    Alcohol use: Yes     Comment: social    Drug use: No       Family History   Problem Relation Age of Onset    Asthma Mother     Arthritis Mother     Asthma Paternal Grandmother     Arthritis Paternal  "Grandmother     Alzheimer's disease Family          Above history personally reviewed.       EXAM    Vitals:Blood pressure 150/90, pulse 86, temperature 98.9 °F (37.2 °C), temperature source Temporal, resp. rate 16, height 6' 1\" (1.854 m), weight 118 kg (260 lb), SpO2 96%.,Body mass index is 34.3 kg/m².     Physical Exam  Constitutional:       Appearance: Normal appearance. He is well-developed.   HENT:      Head: Normocephalic and atraumatic.   Eyes:      Extraocular Movements: Extraocular movements intact and EOM normal.   Neck:      Vascular: No JVD.   Cardiovascular:      Rate and Rhythm: Normal rate.   Pulmonary:      Effort: Pulmonary effort is normal.   Musculoskeletal:         General: No tenderness or deformity. Normal range of motion.      Cervical back: Normal range of motion and neck supple.   Skin:     General: Skin is warm and dry.   Neurological:      Mental Status: He is alert and oriented to person, place, and time.      Cranial Nerves: No cranial nerve deficit.      Sensory: No sensory deficit.      Motor: Motor strength is normal.No weakness.      Gait: Gait is intact.      Deep Tendon Reflexes: Reflexes are normal and symmetric.   Psychiatric:         Speech: Speech normal.         Behavior: Behavior normal.         Thought Content: Thought content normal.         Neurologic Exam     Mental Status   Oriented to person, place, and time.   Attention: normal.   Speech: speech is normal   Level of consciousness: alert  Knowledge: good.   Normal comprehension.     Cranial Nerves     CN III, IV, VI   Extraocular motions are normal.   Upgaze: normal  Downgaze: normal    CN V   Facial sensation intact.     CN VII   Facial expression full, symmetric.     CN VIII   CN VIII normal.   Hearing: intact    CN XII   CN XII normal.   Tongue deviation: none    Motor Exam   Muscle bulk: normal  Right arm tone: normal  Left arm tone: normal  Right leg tone: normal  Left leg tone: normal    Strength   Strength 5/5 " throughout.     Sensory Exam   Light touch normal.     Gait, Coordination, and Reflexes     Gait  Gait: normal    Tremor   Resting tremor: absent  Action tremor: absent    MEDICAL DECISION MAKING    Imaging Studies:     CTA head and neck with and without contrast    Addendum Date:  Addendum:     Result Date: 5/14/2024  Narrative: CTA NECK AND BRAIN WITH AND WITHOUT CONTRAST INDICATION: Significant headache and popping sensation while straining to have bowel movement COMPARISON:   None. TECHNIQUE:  Routine CT imaging of the Brain without contrast.  Post contrast imaging was performed after administration of iodinated contrast through the neck and brain. Post contrast axial 0.625 mm images timed to opacify the arterial system. 3D rendering was performed on an independent workstation.   MIP reconstructions performed. Coronal reconstructions were performed of the noncontrast portion of the brain. Radiation dose length product (DLP) for this visit:  1589 mGy-cm .  This examination, like all CT scans performed in the [facility], was performed utilizing techniques to minimize radiation dose exposure, including the use of iterative reconstruction and automated exposure control. IV Contrast:  85 mL of iohexol (OMNIPAQUE) IMAGE QUALITY:   Streak artifact through the medulla limits evaluation. FINDINGS: NONCONTRAST BRAIN PARENCHYMA: Hypodensity within the right thalamus (2:23), nonspecific and may represent age-indeterminate infarct. No intracranial mass, mass effect or midline shift. No acute parenchymal hemorrhage. VENTRICLES AND EXTRA-AXIAL SPACES:  Normal for the patient's age. VISUALIZED ORBITS: Normal visualized orbits. PARANASAL SINUSES: Mild mucosal thickening of the ethmoid air cells. CERVICAL VASCULATURE AORTIC ARCH AND GREAT VESSELS:  Normal aortic arch and great vessel origins. Normal visualized subclavian vessels. RIGHT VERTEBRAL ARTERY CERVICAL SEGMENT:  Normal origin. The vessel is normal in  caliber throughout the neck. LEFT VERTEBRAL ARTERY CERVICAL SEGMENT:  Normal origin. The vessel is normal in caliber throughout the neck. RIGHT EXTRACRANIAL CAROTID SEGMENT:  Normal caliber common carotid artery.  Normal bifurcation and cervical internal carotid artery.  No stenosis or dissection. LEFT EXTRACRANIAL CAROTID SEGMENT:  Normal caliber common carotid artery.  Normal bifurcation and cervical internal carotid artery.  No stenosis or dissection. NASCET criteria was used to determine the degree of internal carotid artery diameter stenosis. INTRACRANIAL VASCULATURE INTERNAL CAROTID ARTERIES:  Normal enhancement of the intracranial portions of the internal carotid arteries.  Normal ophthalmic artery origins.  Normal ICA terminus. ANTERIOR CIRCULATION:  Symmetric A1 segments with normal enhancement. Mildly hypoplastic right and dominant left A2 segment with no high-grade stenosis. Fusiform aneurysmal dilatation measuring 2 mm of the right distal A2 segment (5:144). Multifocal mild  stenosis of the distal left A2 segment 5:144, 129). Normal anterior communicating artery. MIDDLE CEREBRAL ARTERY CIRCULATION: Small fenestration within the proximal right M1 segment. Otherwise M1 segment and middle cerebral artery branches demonstrate normal enhancement bilaterally. DISTAL VERTEBRAL ARTERIES:  Normal distal vertebral arteries.  Posterior inferior cerebellar artery origins are normal. BASILAR ARTERY:  Basilar artery is normal in caliber.  Normal superior cerebellar arteries. POSTERIOR CEREBRAL ARTERIES: Both posterior cerebral arteries arises from the basilar tip.  Both arteries demonstrate normal enhancement.   Posterior communicating arteries are not visualized and may be hypoplastic or absent. VENOUS STRUCTURES:  Normal. NON VASCULAR ANATOMY BONY STRUCTURES:  No acute osseous abnormality. Spondylosis at C5-C6 with posterior disc osteophyte complex. SOFT TISSUES OF THE NECK: Prominent bilateral level 2 lymph nodes  measuring up to 1 cm. THORACIC INLET:  Normal.     Impression: CT head: -Hypodensity within the right thalamus (2:23), nonspecific and may represent age-indeterminate infarct. Recommend MRI of the brain for further evaluation. CTA head: -No large vessel occlusion, high-grade stenosis. -Fusiform aneurysmal dilatation of the distal right A2 segment measuring 2 mm in greatest axial dimension. Recommend consultation with the [facility], a [facility] of [facility] at [phone]. -Short segment multifocal mild luminal narrowing of the distal right A2 segment, nonspecific. Differential includes vasculitis, vasospasm among other etiologies. CTA neck: -No high-grade stenosis, dissection or aneurysm. Other: -Bilateral level 2 lymphadenopathy measuring up to 1 cm. Findings may be reactive. Clinical correlation advised. The study was marked in EPIC for immediate notification. Workstation performed: [id number]       I have personally reviewed pertinent reports.

## 2024-05-20 NOTE — ASSESSMENT & PLAN NOTE
CTA showed 2 mm aneurysmal dilation of the right A2 segment as well as hypodensity in the right thalamus  Radiology recommended MRI brain for further evaluation  MRI with and without contrast placed at this time.

## 2024-05-20 NOTE — ASSESSMENT & PLAN NOTE
Presents for new patient consultation regarding an incidentally noted cerebral aneurysm  Patient had ER presentation on 5/14 with severe sudden onset headache    Imaging:   CTA 5/14/2024: Fusiform aneurysmal dilation of the right A2 segment measuring approximately 2 mm.  No evidence of saccular berry aneurysm.  No significant stenosis or occlusion.  Hypodensity within the right thalamus nonspecific and may represent age-indeterminate infarct.    Plan:   Continue to monitor symptoms   Reviewed imaging with patient in room  Extensively discussed natural history of aneurysms.   Discussed that based on ISUIA she has a < 1%/ 5yr risk of aneurysm rupture.   Discussed modifiable risk factors including hypertension, and smoking.   Discussed signs and symptoms of aneurysm rupture including severe, sudden onset headache, neck pain, nausea and vomiting, and seizure.   Reiterated that these symptoms should prompt her to visit an emergency department immediately.  Continue taking OTC pain medication for headache management as needed  Continue with magnesium and vitamin B supplements   Continue to be active with walking and low impact activities   Will place referral to outpatient neurology  Possible stroke in R thalamus, headaches management and evaluation/work up for tremors  MRI brain with and without contrast for evaluation of R thalamic hypodensity  Do not anticipate intervention will be required. If infarct noted, this can be followed up and addressed with neurology.   CTA head with and without contrast ordered for follow up of aneurysm dilation in 6 months  If stable consider prn follow up as no intervention will likely be required.   Follow up in 6 months with repeat CTA. Encouraged to call with questions or concerns

## 2024-06-10 ENCOUNTER — TELEPHONE (OUTPATIENT)
Dept: FAMILY MEDICINE CLINIC | Facility: CLINIC | Age: 34
End: 2024-06-10

## 2024-06-10 NOTE — TELEPHONE ENCOUNTER
Pt had a physical scheduled with Dr. Cole on 6/11 3:45, but Dr. Cole will be unavailable at that time. Called to reschedule, pt agreed to be rescheduled with Dr. Gary.

## 2024-06-15 ENCOUNTER — HOSPITAL ENCOUNTER (OUTPATIENT)
Dept: MRI IMAGING | Facility: HOSPITAL | Age: 34
Discharge: HOME | End: 2024-06-15
Payer: COMMERCIAL

## 2024-06-15 DIAGNOSIS — R93.89 ABNORMAL COMPUTED TOMOGRAPHY ANGIOGRAPHY (CTA): ICD-10-CM

## 2024-06-15 PROCEDURE — A9585 GADOBUTROL INJECTION: HCPCS | Performed by: PHYSICIAN ASSISTANT

## 2024-06-15 PROCEDURE — 70553 MRI BRAIN STEM W/O & W/DYE: CPT

## 2024-06-15 RX ORDER — GADOBUTROL 604.72 MG/ML
12 INJECTION INTRAVENOUS
Status: COMPLETED | OUTPATIENT
Start: 2024-06-15 | End: 2024-06-15

## 2024-06-15 RX ADMIN — GADOBUTROL 12 ML: 604.72 INJECTION INTRAVENOUS at 08:22

## 2024-06-17 ENCOUNTER — OFFICE VISIT (OUTPATIENT)
Dept: FAMILY MEDICINE CLINIC | Facility: CLINIC | Age: 34
End: 2024-06-17
Payer: COMMERCIAL

## 2024-06-17 VITALS
SYSTOLIC BLOOD PRESSURE: 140 MMHG | TEMPERATURE: 98 F | BODY MASS INDEX: 34.14 KG/M2 | HEART RATE: 85 BPM | OXYGEN SATURATION: 96 % | WEIGHT: 257.6 LBS | HEIGHT: 73 IN | DIASTOLIC BLOOD PRESSURE: 64 MMHG

## 2024-06-17 DIAGNOSIS — R53.83 OTHER FATIGUE: ICD-10-CM

## 2024-06-17 DIAGNOSIS — G43.711 INTRACTABLE CHRONIC MIGRAINE WITHOUT AURA AND WITH STATUS MIGRAINOSUS: ICD-10-CM

## 2024-06-17 DIAGNOSIS — I67.1 CEREBRAL ANEURYSM, NONRUPTURED: Primary | ICD-10-CM

## 2024-06-17 DIAGNOSIS — E66.09 CLASS 1 OBESITY DUE TO EXCESS CALORIES WITHOUT SERIOUS COMORBIDITY WITH BODY MASS INDEX (BMI) OF 33.0 TO 33.9 IN ADULT: ICD-10-CM

## 2024-06-17 DIAGNOSIS — Z13.220 SCREENING, LIPID: ICD-10-CM

## 2024-06-17 PROBLEM — E66.811 CLASS 1 OBESITY DUE TO EXCESS CALORIES WITHOUT SERIOUS COMORBIDITY WITH BODY MASS INDEX (BMI) OF 33.0 TO 33.9 IN ADULT: Status: ACTIVE | Noted: 2024-06-17

## 2024-06-17 PROBLEM — G43.719 INTRACTABLE CHRONIC MIGRAINE WITHOUT AURA: Status: ACTIVE | Noted: 2024-06-17

## 2024-06-17 PROCEDURE — 99203 OFFICE O/P NEW LOW 30 MIN: CPT | Performed by: INTERNAL MEDICINE

## 2024-06-17 NOTE — PROGRESS NOTES
Adult Annual Physical  Name: Vega Troy      : 1990      MRN: [id number]  Encounter Provider: Gail Gary MD  Encounter Date: 2024   Encounter department: [facility]    Assessment & Plan   1. Cerebral aneurysm, nonruptured  Assessment & Plan:  It was recently found to have cerebral aneurysm and is has an appointment with neuro- for further evaluation  2. Intractable chronic migraine without aura and with status migrainosus  Assessment & Plan:  Patient is had chronic migraines for years and takes almost daily aspirin for same.  3. Class 1 obesity due to excess calories without serious comorbidity with body mass index (BMI) of 33.0 to 33.9 in adult  Assessment & Plan:  Patient has always had a weight problem although it was much more severe he has gone down to where he is now through diet and exercise  4. Screening, lipid  -     Lipid panel; Future  5. Other fatigue  -     Comprehensive metabolic panel; Future  -     CBC and differential; Future    Immunizations and preventive care screenings were discussed with patient today. Appropriate education was printed on patient's after visit summary.    Counseling:  Dental Health: discussed importance of regular tooth brushing, flossing, and dental visits.      Depression Screening and Follow-up Plan: Patient was screened for depression during today's encounter. They screened negative with a PHQ-2 score of 0.        History of Present Illness     Adult Annual Physical:  Patient presents for annual physical. Patient is only major problem is recurrent migraines she is currently being worked up for and is felt to have an aneurysm. He did have a a CAT scan and is now referred to neurology to go over an MRI. At present he takes almost daily aspirin for his headaches. He is no high blood pressure has never been checked for cholesterol sugar etc..     Diet and Physical Activity:  - Diet/Nutrition: well balanced diet and heart healthy (low sodium)  "diet.  - Exercise: walking.    Depression Screening:  - PHQ-2 Score: 0    General Health:  - Sleep: 4-6 hours of sleep on average.  - Hearing: normal hearing right ear.  - Vision: no vision problems.  - Dental: no dental visits for > 1 year.     Health:  - History of STDs: no.   - Urinary symptoms: none.     Advanced Care Planning:  - Has an advanced directive?: no    - Has a durable medical POA?: no    - ACP document given to patient?: no      Review of Systems   Constitutional:  Negative for chills and fever.   HENT:  Negative for ear pain and sore throat.    Eyes:  Negative for pain and visual disturbance.   Respiratory:  Negative for cough and shortness of breath.    Cardiovascular:  Negative for chest pain and palpitations.   Gastrointestinal:  Negative for abdominal pain and vomiting.   Genitourinary:  Negative for dysuria and hematuria.   Musculoskeletal:  Negative for arthralgias and back pain.   Skin:  Negative for color change and rash.   Allergic/Immunologic: Negative for immunocompromised state.   Neurological:  Positive for headaches. Negative for seizures and syncope.   Hematological:  Does not bruise/bleed easily.   Psychiatric/Behavioral: Negative.     All other systems reviewed and are negative.        Objective     /64 (BP Location: Right arm, Patient Position: Sitting, Cuff Size: Large)   Pulse 85   Temp 98 °F (36.7 °C) (Temporal)   Ht 6' 1\" (1.854 m)   Wt 117 kg (257 lb 9.6 oz)   SpO2 96%   BMI 33.99 kg/m²     Physical Exam  Vitals and nursing note reviewed.   Constitutional:       General: He is not in acute distress.     Appearance: He is well-developed. He is obese.   HENT:      Head: Normocephalic and atraumatic.      Right Ear: Tympanic membrane normal.      Left Ear: Tympanic membrane normal.      Nose: Nose normal. No congestion.      Mouth/Throat:      Mouth: Mucous membranes are moist.      Pharynx: No posterior oropharyngeal erythema.   Eyes:      Extraocular Movements: " Extraocular movements intact.      Conjunctiva/sclera: Conjunctivae normal.      Pupils: Pupils are equal, round, and reactive to light.   Neck:      Vascular: No carotid bruit.      Comments: No thyroid Rosibel  Cardiovascular:      Rate and Rhythm: Normal rate and regular rhythm.      Pulses: Normal pulses.      Heart sounds: No murmur heard.  Pulmonary:      Effort: Pulmonary effort is normal. No respiratory distress.      Breath sounds: Normal breath sounds. No wheezing, rhonchi or rales.   Abdominal:      General: Bowel sounds are normal.      Palpations: Abdomen is soft.      Tenderness: There is no abdominal tenderness.   Musculoskeletal:         General: No swelling.      Cervical back: Neck supple.      Right lower leg: No edema.      Left lower leg: No edema.   Lymphadenopathy:      Cervical: No cervical adenopathy.   Skin:     General: Skin is warm and dry.      Capillary Refill: Capillary refill takes less than 2 seconds.      Findings: No lesion or rash.   Neurological:      General: No focal deficit present.      Mental Status: He is alert and oriented to person, place, and time.      Gait: Gait normal.      Deep Tendon Reflexes: Reflexes abnormal.   Psychiatric:         Mood and Affect: Mood normal.         Behavior: Behavior normal.         Thought Content: Thought content normal.         Judgment: Judgment normal.       Administrative Statements

## 2024-06-17 NOTE — ASSESSMENT & PLAN NOTE
It was recently found to have cerebral aneurysm and is has an appointment with neuro- for further evaluation

## 2024-06-17 NOTE — ASSESSMENT & PLAN NOTE
Patient has always had a weight problem although it was much more severe he has gone down to where he is now through diet and exercise

## 2024-07-02 ENCOUNTER — APPOINTMENT (OUTPATIENT)
Dept: LAB | Facility: MEDICAL CENTER | Age: 34
End: 2024-07-02
Payer: COMMERCIAL

## 2024-07-02 DIAGNOSIS — R53.83 OTHER FATIGUE: ICD-10-CM

## 2024-07-02 DIAGNOSIS — Z13.220 SCREENING, LIPID: ICD-10-CM

## 2024-07-02 LAB
BASOPHILS # BLD AUTO: 0.04 THOUSANDS/ÂΜL (ref 0–0.1)
BASOPHILS NFR BLD AUTO: 1 % (ref 0–1)
EOSINOPHIL # BLD AUTO: 0.13 THOUSAND/ÂΜL (ref 0–0.61)
EOSINOPHIL NFR BLD AUTO: 2 % (ref 0–6)
ERYTHROCYTE [DISTWIDTH] IN BLOOD BY AUTOMATED COUNT: 12.7 % (ref 11.6–15.1)
HCT VFR BLD AUTO: 46.2 % (ref 36.5–49.3)
HGB BLD-MCNC: 15.4 G/DL (ref 12–17)
IMM GRANULOCYTES # BLD AUTO: 0.04 THOUSAND/UL (ref 0–0.2)
IMM GRANULOCYTES NFR BLD AUTO: 1 % (ref 0–2)
LYMPHOCYTES # BLD AUTO: 2.23 THOUSANDS/ÂΜL (ref 0.6–4.47)
LYMPHOCYTES NFR BLD AUTO: 32 % (ref 14–44)
MCH RBC QN AUTO: 30.7 PG (ref 26.8–34.3)
MCHC RBC AUTO-ENTMCNC: 33.3 G/DL (ref 31.4–37.4)
MCV RBC AUTO: 92 FL (ref 82–98)
MONOCYTES # BLD AUTO: 0.52 THOUSAND/ÂΜL (ref 0.17–1.22)
MONOCYTES NFR BLD AUTO: 8 % (ref 4–12)
NEUTROPHILS # BLD AUTO: 3.93 THOUSANDS/ÂΜL (ref 1.85–7.62)
NEUTS SEG NFR BLD AUTO: 56 % (ref 43–75)
NRBC BLD AUTO-RTO: 0 /100 WBCS
PLATELET # BLD AUTO: 213 THOUSANDS/UL (ref 149–390)
PMV BLD AUTO: 11.5 FL (ref 8.9–12.7)
RBC # BLD AUTO: 5.01 MILLION/UL (ref 3.88–5.62)
WBC # BLD AUTO: 6.89 THOUSAND/UL (ref 4.31–10.16)

## 2024-07-02 PROCEDURE — 36415 COLL VENOUS BLD VENIPUNCTURE: CPT

## 2024-07-02 PROCEDURE — 85025 COMPLETE CBC W/AUTO DIFF WBC: CPT

## 2024-07-02 PROCEDURE — 80053 COMPREHEN METABOLIC PANEL: CPT

## 2024-07-02 PROCEDURE — 80061 LIPID PANEL: CPT

## 2024-07-03 ENCOUNTER — TELEPHONE (OUTPATIENT)
Dept: FAMILY MEDICINE CLINIC | Facility: CLINIC | Age: 34
End: 2024-07-03

## 2024-07-03 LAB
ALBUMIN SERPL BCG-MCNC: 4.6 G/DL (ref 3.5–5)
ALP SERPL-CCNC: 50 U/L (ref 34–104)
ALT SERPL W P-5'-P-CCNC: 43 U/L (ref 7–52)
ANION GAP SERPL CALCULATED.3IONS-SCNC: 14 MMOL/L (ref 4–13)
AST SERPL W P-5'-P-CCNC: 24 U/L (ref 13–39)
BILIRUB SERPL-MCNC: 0.71 MG/DL (ref 0.2–1)
BUN SERPL-MCNC: 10 MG/DL (ref 5–25)
CALCIUM SERPL-MCNC: 9.6 MG/DL (ref 8.4–10.2)
CHLORIDE SERPL-SCNC: 101 MMOL/L (ref 96–108)
CHOLEST SERPL-MCNC: 168 MG/DL
CO2 SERPL-SCNC: 24 MMOL/L (ref 21–32)
CREAT SERPL-MCNC: 0.9 MG/DL (ref 0.6–1.3)
GFR SERPL CREATININE-BSD FRML MDRD: 109 ML/MIN/1.73SQ M
GLUCOSE P FAST SERPL-MCNC: 77 MG/DL (ref 65–99)
HDLC SERPL-MCNC: 38 MG/DL
LDLC SERPL CALC-MCNC: 78 MG/DL (ref 0–100)
NONHDLC SERPL-MCNC: 130 MG/DL
POTASSIUM SERPL-SCNC: 4.1 MMOL/L (ref 3.5–5.3)
PROT SERPL-MCNC: 7.4 G/DL (ref 6.4–8.4)
SODIUM SERPL-SCNC: 139 MMOL/L (ref 135–147)
TRIGL SERPL-MCNC: 260 MG/DL

## 2024-07-08 ENCOUNTER — TELEPHONE (OUTPATIENT)
Age: 34
End: 2024-07-08

## 2024-07-16 ENCOUNTER — OFFICE VISIT (OUTPATIENT)
Age: 34
End: 2024-07-16
Payer: COMMERCIAL

## 2024-07-16 VITALS
WEIGHT: 257 LBS | BODY MASS INDEX: 34.06 KG/M2 | HEIGHT: 73 IN | TEMPERATURE: 99.6 F | HEART RATE: 115 BPM | DIASTOLIC BLOOD PRESSURE: 110 MMHG | SYSTOLIC BLOOD PRESSURE: 178 MMHG | RESPIRATION RATE: 16 BRPM | OXYGEN SATURATION: 96 %

## 2024-07-16 DIAGNOSIS — G93.9 BRAIN LESION: Primary | ICD-10-CM

## 2024-07-16 PROCEDURE — 99214 OFFICE O/P EST MOD 30 MIN: CPT | Performed by: PHYSICIAN ASSISTANT

## 2024-07-16 NOTE — PROGRESS NOTES
Ambulatory Visit  Name: Vega Troy      : 1990      MRN: [id number]  Encounter Provider: Ze Wells PA-C  Encounter Date: 2024   Encounter department: [facility]    Assessment & Plan   1. 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 Small fusiform aneurysmal dilation of the R JESS A2 segment.     Plan:   Continue to monitor symptoms   Discussed imaging results with patient and wife in room   MRI brain with T2 and flair sequence hyperintensity concerning for small low grade glioma.   Discussed gliomas with patient and wife   Will continue with imaging surveillance in short interval  Plan for MRI brain with brain tumor protocol in 6 weeks. Reviewed with Dr. Jurado and will follow up with neurosurgeon  Addressed all questions and concerns with patient and wife.   Follow up as scheduled. Contact the office with any issues or concerns should they arise sooner.   Orders:  -     MRI brain bt w wo contrast; Future; Expected date: 2024      History of Present Illness     Vega Troy is a 34 y.o. male who returns to the outpatient neurosurgical office. Patient was originally seen in the outpatient office as a new consultation on 2024. He was referred at that time after he presented to the ED with a migraine and was noted to have aneurysmal dilation of the A2 segment. He was also noted to have a hypodensity in the R thalamus. At his appointment, I ordered an MRI brain with and without contrast as well for evaluation of this hypodensity on CT imaging. He continues with some daily headaches without  significant change since prior. No new or worsening neurological symptoms at this time.     Review of Systems   Constitutional: Negative.    HENT: Negative.     Eyes: Negative.    Respiratory: Negative.     Cardiovascular: Negative.    Gastrointestinal: Negative.    Genitourinary: Negative.    Musculoskeletal: Negative.    Neurological:  Positive for headaches (daily, mostly pressure/ occipital).   Psychiatric/Behavioral: Negative.       Past Medical History   Past Medical History:   Diagnosis Date    Abdominal pain     Aneurysm (HCC)     Fusiform aneurysmal dilatation    C. difficile colitis     2011    Pilonidal cyst      Past Surgical History:   Procedure Laterality Date    COLONOSCOPY N/A 11/29/2016    Procedure: COLONOSCOPY;  Surgeon: Mono Engel MD;  Location: [facility];  Service:     OCCULT BLOOD, FECAL IMMUNOCHEMICAL (HISTORICAL)      [place] EXCISION PILONIDAL CYST/SINUS SIMPLE N/A 8/28/2018    Procedure: EXCISION PILONIDAL CYST;  Surgeon: Daniel Aparicio DO;  Location: [facility];  Service: General     Family History   Problem Relation Age of Onset    Asthma Mother     Arthritis Mother     Heart failure Mother     Asthma Paternal Grandmother     Arthritis Paternal Grandmother     Alzheimer's disease Family      Current Outpatient Medications on File Prior to Visit   Medication Sig Dispense Refill    MAGNESIUM PO Take 400 mg by mouth every morning       No current facility-administered medications on file prior to visit.   No Known Allergies   Current Outpatient Medications on File Prior to Visit   Medication Sig Dispense Refill    MAGNESIUM PO Take 400 mg by mouth every morning       No current facility-administered medications on file prior to visit.      Social History     Tobacco Use    Smoking status: Former     Average packs/day: 1 pack/day for 17.0 years (17.0 ttl pk-yrs)     Types: Cigarettes     Start date: 2001    Smokeless tobacco: Current     Types: Snuff    Tobacco comments:     Nicotine packs  "  Vaping Use    Vaping status: Every Day    Start date: 2/1/2018   Substance and Sexual Activity    Alcohol use: Yes     Comment: social    Drug use: Yes     Types: Marijuana     Comment: Medical Marijuana Card    Sexual activity: Not on file     Objective     BP (!) 178/110 (BP Location: Left arm)   Pulse (!) 115   Temp 99.6 °F (37.6 °C)   Resp 16   Ht 6' 1\" (1.854 m)   Wt 117 kg (257 lb)   SpO2 96%   BMI 33.91 kg/m²     Physical Exam  Constitutional:       Appearance: Normal appearance. He is well-developed.   HENT:      Head: Normocephalic and atraumatic.   Eyes:      Extraocular Movements: Extraocular movements intact and EOM normal.   Neck:      Vascular: No JVD.   Cardiovascular:      Rate and Rhythm: Normal rate.   Pulmonary:      Effort: Pulmonary effort is normal.   Musculoskeletal:         General: No deformity. Normal range of motion.   Skin:     General: Skin is warm and dry.   Neurological:      Mental Status: He is alert and oriented to person, place, and time.      Cranial Nerves: No cranial nerve deficit.      Sensory: No sensory deficit.      Motor: Motor strength is normal.No weakness.      Gait: Gait is intact.      Deep Tendon Reflexes: Reflexes are normal and symmetric.   Psychiatric:         Behavior: Behavior normal.         Thought Content: Thought content normal.     Neurologic Exam     Mental Status   Oriented to person, place, and time.     Cranial Nerves     CN III, IV, VI   Extraocular motions are normal.     CN VII   Facial expression full, symmetric.     CN VIII   CN VIII normal.   Hearing: intact    Motor Exam     Strength   Strength 5/5 throughout.     Sensory Exam   Light touch normal.     Gait, Coordination, and Reflexes     Gait  Gait: normal    Tremor   Resting tremor: absent  Action tremor: absent    Administrative Statements   I have spent a total time of 30 minutes in caring for this patient on the day of the visit/encounter including Diagnostic results, Prognosis, " Risks and benefits of tx options, Instructions for management, Patient and family education, Importance of tx compliance, Risk factor reductions, Impressions, Counseling / Coordination of care, Documenting in the medical record, Reviewing / ordering tests, medicine, procedures  , and Obtaining or reviewing history  .

## 2024-07-19 ENCOUNTER — OFFICE VISIT (OUTPATIENT)
Dept: FAMILY MEDICINE CLINIC | Facility: CLINIC | Age: 34
End: 2024-07-19
Payer: COMMERCIAL

## 2024-07-19 VITALS
SYSTOLIC BLOOD PRESSURE: 172 MMHG | HEIGHT: 73 IN | RESPIRATION RATE: 16 BRPM | TEMPERATURE: 97.8 F | OXYGEN SATURATION: 97 % | BODY MASS INDEX: 33.8 KG/M2 | HEART RATE: 87 BPM | DIASTOLIC BLOOD PRESSURE: 100 MMHG | WEIGHT: 255 LBS

## 2024-07-19 DIAGNOSIS — I67.1 CEREBRAL ANEURYSM, NONRUPTURED: ICD-10-CM

## 2024-07-19 DIAGNOSIS — G93.9 BRAIN LESION: ICD-10-CM

## 2024-07-19 DIAGNOSIS — F41.9 ANXIETY: Primary | ICD-10-CM

## 2024-07-19 PROCEDURE — 99213 OFFICE O/P EST LOW 20 MIN: CPT | Performed by: INTERNAL MEDICINE

## 2024-07-19 RX ORDER — ALPRAZOLAM 0.25 MG/1
0.25 TABLET ORAL
Qty: 30 TABLET | Refills: 0 | Status: SHIPPED | OUTPATIENT
Start: 2024-07-19

## 2024-07-19 NOTE — PROGRESS NOTES
"Ambulatory Visit  Name: Vega Troy      : 1990      MRN: [id number]  Encounter Provider: Gail Gary MD  Encounter Date: 2024   Encounter department: [facility]    Assessment & Plan   1. Anxiety  -     ALPRAZolam (XANAX) 0.25 mg tablet; Take 1 tablet (0.25 mg total) by mouth daily at bedtime as needed for anxiety  -     sertraline (Zoloft) 50 mg tablet; Take 1 tablet (50 mg total) by mouth daily       History of Present Illness     Patient recently underwent CNS imaging for headaches and found to have a small aneurysm and a questionable mass on MRI. He is been seen in follow-up for this fee schedule for frequent repeat studies. But he is having a hard time dealing with it without significant anxiety.        Review of Systems   Constitutional:  Negative for chills and fever.   HENT:  Negative for ear pain and sore throat.    Eyes:  Negative for pain and visual disturbance.   Respiratory:  Negative for cough and shortness of breath.    Cardiovascular:  Negative for chest pain and palpitations.   Gastrointestinal:  Negative for abdominal pain and vomiting.   Genitourinary:  Negative for dysuria and hematuria.   Musculoskeletal:  Negative for arthralgias and back pain.   Skin:  Negative for color change and rash.   Neurological:  Negative for seizures and syncope.   Psychiatric/Behavioral:  Positive for agitation, dysphoric mood and sleep disturbance. The patient is nervous/anxious.    All other systems reviewed and are negative.      Objective     BP (!) 172/100 (BP Location: Right arm, Patient Position: Sitting, Cuff Size: Large)   Pulse 87   Temp 97.8 °F (36.6 °C) (Temporal)   Resp 16   Ht 6' 1\" (1.854 m)   Wt 116 kg (255 lb)   SpO2 97%   BMI 33.64 kg/m²     Physical Exam  Vitals and nursing note reviewed.   Constitutional:       General: He is not in acute distress.     Appearance: He is well-developed. He is obese.   HENT:      Head: Normocephalic and atraumatic.      Right " Ear: External ear normal.      Left Ear: External ear normal.      Nose: Nose normal.      Mouth/Throat:      Pharynx: No oropharyngeal exudate.   Eyes:      Conjunctiva/sclera: Conjunctivae normal.      Pupils: Pupils are equal, round, and reactive to light.   Neck:      Thyroid: No thyromegaly.      Vascular: No JVD.   Cardiovascular:      Rate and Rhythm: Normal rate and regular rhythm.      Heart sounds: Normal heart sounds. No murmur heard.     No gallop.   Pulmonary:      Effort: Pulmonary effort is normal. No respiratory distress.      Breath sounds: Normal breath sounds. No wheezing or rales.   Abdominal:      General: Bowel sounds are normal. There is no distension.      Palpations: Abdomen is soft. There is no mass.      Tenderness: There is no abdominal tenderness.   Musculoskeletal:         General: No swelling or tenderness. Normal range of motion.      Cervical back: Normal range of motion and neck supple.   Lymphadenopathy:      Cervical: No cervical adenopathy.   Skin:     General: Skin is warm and dry.      Capillary Refill: Capillary refill takes less than 2 seconds.      Findings: No rash.   Neurological:      General: No focal deficit present.      Mental Status: He is alert and oriented to person, place, and time.      Cranial Nerves: No cranial nerve deficit.      Coordination: Coordination normal.   Psychiatric:         Behavior: Behavior normal.         Thought Content: Thought content normal.         Judgment: Judgment normal.      Comments: Very anxious       Administrative Statements

## 2024-07-19 NOTE — ASSESSMENT & PLAN NOTE
He also has a small questionable lesion of his brain that they're going to follow serially to see if it changes since satisfactory can't get at easily

## 2024-07-19 NOTE — ASSESSMENT & PLAN NOTE
Is having significant anxiety surrounding the findings on his MRI and CT scansHe is having significant anxiety over mri of beain. A few Xanax that he can even try breaking in half when it's really bad and start him on low-dose Zoloft

## 2024-07-19 NOTE — ASSESSMENT & PLAN NOTE
He has a small aneurysm that is felt not to be at risk of rupturing will follow up with neurosurgery again

## 2024-08-12 ENCOUNTER — RA CDI HCC (OUTPATIENT)
Dept: OTHER | Facility: HOSPITAL | Age: 34
End: 2024-08-12

## 2024-08-19 ENCOUNTER — OFFICE VISIT (OUTPATIENT)
Dept: FAMILY MEDICINE CLINIC | Facility: CLINIC | Age: 34
End: 2024-08-19
Payer: COMMERCIAL

## 2024-08-19 VITALS
DIASTOLIC BLOOD PRESSURE: 102 MMHG | HEIGHT: 73 IN | RESPIRATION RATE: 16 BRPM | OXYGEN SATURATION: 98 % | SYSTOLIC BLOOD PRESSURE: 136 MMHG | BODY MASS INDEX: 34.06 KG/M2 | WEIGHT: 257 LBS | HEART RATE: 70 BPM | TEMPERATURE: 97.5 F

## 2024-08-19 DIAGNOSIS — F41.9 ANXIETY: ICD-10-CM

## 2024-08-19 PROCEDURE — 99213 OFFICE O/P EST LOW 20 MIN: CPT | Performed by: INTERNAL MEDICINE

## 2024-08-19 RX ORDER — SERTRALINE HYDROCHLORIDE 100 MG/1
100 TABLET, FILM COATED ORAL DAILY
Qty: 90 TABLET | Refills: 1 | Status: SHIPPED | OUTPATIENT
Start: 2024-08-19

## 2024-08-19 NOTE — ASSESSMENT & PLAN NOTE
Patient is feeling a little better on 50 mg of Zoloft a day however he still has breakthrough anxiety to increase the dose to hundred his weight is stable and is sleeping okay

## 2024-08-19 NOTE — PROGRESS NOTES
"Ambulatory Visit  Name: Vega Troy      : 1990      MRN: [id number]  Encounter Provider: Gail Gary MD  Encounter Date: 2024   Encounter department: [facility]    Assessment & Plan   1. Anxiety  Assessment & Plan:  Patient is feeling a little better on 50 mg of Zoloft a day however he still has breakthrough anxiety to increase the dose to hundred his weight is stable and is sleeping okay  Orders:  -     sertraline (Zoloft) 100 mg tablet; Take 1 tablet (100 mg total) by mouth daily       History of Present Illness     Anxiety  Presents for follow-up visit. Symptoms include depressed mood, excessive worry, insomnia, nervous/anxious behavior and restlessness. Primary symptoms comment: Symptoms are much less. Symptoms occur occasionally. The most recent episode lasted 1 hour. The severity of symptoms is mild. The quality of sleep is good. Nighttime awakenings: occasional.     Compliance with medications is %. Treatment side effects: no Significant side effects.       Review of Systems   Constitutional:  Negative for chills and fever.   HENT:  Negative for ear pain and sore throat.    Eyes:  Negative for pain and visual disturbance.   Respiratory:  Negative for cough.    Gastrointestinal:  Negative for abdominal pain and vomiting.   Genitourinary:  Negative for dysuria and hematuria.   Musculoskeletal:  Negative for arthralgias and back pain.   Skin:  Negative for color change and rash.   Neurological:  Positive for headaches. Negative for seizures and syncope.   Psychiatric/Behavioral:  Positive for dysphoric mood. The patient is nervous/anxious and has insomnia.    All other systems reviewed and are negative.      Objective     BP (!) 136/102 (BP Location: Right arm, Patient Position: Sitting, Cuff Size: Large)   Pulse 70   Temp 97.5 °F (36.4 °C) (Temporal)   Resp 16   Ht 6' 1\" (1.854 m)   Wt 117 kg (257 lb)   SpO2 98%   BMI 33.91 kg/m²     Physical Exam  Vitals and nursing " note reviewed.   Constitutional:       General: He is not in acute distress.     Appearance: He is well-developed.   HENT:      Head: Normocephalic and atraumatic.   Eyes:      Conjunctiva/sclera: Conjunctivae normal.   Cardiovascular:      Rate and Rhythm: Normal rate and regular rhythm.      Heart sounds: No murmur heard.  Pulmonary:      Effort: Pulmonary effort is normal. No respiratory distress.      Breath sounds: Normal breath sounds.   Abdominal:      Palpations: Abdomen is soft.      Tenderness: There is no abdominal tenderness.   Musculoskeletal:         General: No swelling.      Cervical back: Neck supple.   Skin:     General: Skin is warm and dry.      Capillary Refill: Capillary refill takes less than 2 seconds.   Neurological:      General: No focal deficit present.      Mental Status: He is alert and oriented to person, place, and time. Mental status is at baseline.   Psychiatric:         Mood and Affect: Mood normal.       Administrative Statements

## 2024-08-28 ENCOUNTER — TELEPHONE (OUTPATIENT)
Age: 34
End: 2024-08-28

## 2024-08-28 NOTE — TELEPHONE ENCOUNTER
Pt called in to r/s his appt with Dr. Jurado on 9/12 due to provider not being in the office. Pt was returning call form the office. Pt can not do available appt on 9/4 that was offered. Please call pt to assist with new appt.     Thank you.

## 2024-08-30 ENCOUNTER — HOSPITAL ENCOUNTER (OUTPATIENT)
Dept: MRI IMAGING | Facility: HOSPITAL | Age: 34
Discharge: HOME | End: 2024-08-30
Payer: COMMERCIAL

## 2024-08-30 DIAGNOSIS — G93.9 BRAIN LESION: ICD-10-CM

## 2024-08-30 PROCEDURE — A9585 GADOBUTROL INJECTION: HCPCS | Performed by: INTERNAL MEDICINE

## 2024-08-30 PROCEDURE — 70553 MRI BRAIN STEM W/O & W/DYE: CPT

## 2024-08-30 RX ORDER — GADOBUTROL 604.72 MG/ML
11 INJECTION INTRAVENOUS
Status: COMPLETED | OUTPATIENT
Start: 2024-08-30 | End: 2024-08-30

## 2024-08-30 RX ADMIN — GADOBUTROL 11 ML: 604.72 INJECTION INTRAVENOUS at 07:21

## 2024-09-18 ENCOUNTER — OFFICE VISIT (OUTPATIENT)
Dept: NEUROSURGERY | Facility: CLINIC | Age: 34
End: 2024-09-18
Payer: COMMERCIAL

## 2024-09-18 VITALS
RESPIRATION RATE: 16 BRPM | SYSTOLIC BLOOD PRESSURE: 148 MMHG | WEIGHT: 251 LBS | HEIGHT: 73 IN | DIASTOLIC BLOOD PRESSURE: 84 MMHG | BODY MASS INDEX: 33.27 KG/M2 | TEMPERATURE: 100.4 F | HEART RATE: 88 BPM | OXYGEN SATURATION: 96 %

## 2024-09-18 DIAGNOSIS — I67.1 CEREBRAL ANEURYSM, NONRUPTURED: ICD-10-CM

## 2024-09-18 DIAGNOSIS — R25.1 TREMORS OF NERVOUS SYSTEM: ICD-10-CM

## 2024-09-18 DIAGNOSIS — G93.9 BRAIN LESION: Primary | ICD-10-CM

## 2024-09-18 PROCEDURE — 99215 OFFICE O/P EST HI 40 MIN: CPT | Performed by: NEUROLOGICAL SURGERY

## 2024-09-18 NOTE — PROGRESS NOTES
Patient Id: Vega Troy is a 34 y.o. male        Handedness: Right      Assessment/Plan:    Diagnoses and all orders for this visit:    Brain lesion  -     MRI brain with and without contrast; Future    Tremors of nervous system  -     Ambulatory referral to Neurology; Future    Cerebral aneurysm, nonruptured        Discussion and summary:   Right thalamic lesion/mass.  This has remained largely stable since his initial CT in May and subsequent MRI in June.  While it is impossible to fully predict what this is, whether it is a low-grade glioma, enlarged vascular space, or prior infarct he does not appear to have changed.  Furthermore there is no surrounding edema or swelling and no enhancement.  This is likely an unrelated and low-grade lesion.  We will continue to monitor it for any changes.  We will plan for repeat MRI in 3 months and then 6 months and then annually thereafter.    2.  Small fusiform dilatation of the right A2  I do not believe that this is likely aneurysmal, however we will continue to follow it given a remote family history of aneurysm.  We discussed natural history and diagnosis of aneurysms as well as risk of hemorrhage and growth.  We discussed the signs and symptoms as well as modifiable risk factors.  We will plan for repeat imaging, likely MRA, in June with his subsequent MRI.    3.  Family history of aneurysms.  This is in his maternal great aunt and uncle.  I have provided him materials for family screening specifically regarding his mother.    4.  Tremor.  He appears to have bilateral upper extremity as well as head tremor.  This has been present since he has been a child.  It has affected his quality of living and he is not able to drink from full cups or use spoons and forks appropriately.  As such I have referred him to movement disorder clinic for evaluation of medications or other interventions.    I spent 60 minutes in the care of this patient including the review and interpretation  of imaging and tests results, as well as communication/explanation to the patient  regarding the natural history of this process and test/imaging results, care coordination and documentation.        Chief Complaint: Follow-up      HPI:   This is a pleasant 34-year-old gentleman who presented to the hospital with headache and altered status in early May 2024.  Imaging at that time was concerning for possible thalamic lesion as well as possible fusiform dilatation of his distal JESS.  As such he presents for evaluation.  He states that he did have tremors as a child and had MRIs at that juncture at [facility] but no imaging is present at this time.  He has noted worsening of his tremor especially when he is anxious, has high blood pressure, or under pressure.  He has never tried medication for this.    His past medical history is significant for anxiety.  No past surgical history.    He is not allergic to medications.    He is .  He has a 13-year-old son.  He works in information technology.  He was a previous smoker but quit in 2018 and currently vapes daily.  He also smokes medical marijuana.  No other illicit drug use including heroin, methamphetamine.    He does have family history of aneurysm and subarachnoid hemorrhage in his maternal great aunt and uncle.  His mother has never been screened.    Review of systems obtained by the MA reviewed and updated below.    Review of Systems   Constitutional: Negative.    HENT:  Positive for tinnitus (ringing in both ears more in right).    Eyes:  Positive for visual disturbance (spotty vision with headaches at times).   Respiratory: Negative.     Cardiovascular: Negative.    Gastrointestinal: Negative.    Endocrine: Negative.    Genitourinary: Negative.    Musculoskeletal:  Negative for gait problem.   Skin: Negative.    Allergic/Immunologic: Negative.    Neurological:  Positive for tremors (chronioc  hands /  head/neck recent within last month), weakness (slight  weakness with ) and headaches (more frequent headaches pressure  is intense). Negative for dizziness, seizures, syncope, speech difficulty, light-headedness and numbness.   Hematological: Negative.    Psychiatric/Behavioral:  Negative for behavioral problems and confusion.        Physical Exam  Vitals:    09/18/24 1111   BP: 148/84   Pulse: 88   Resp: 16   Temp: 100.4 °F (38 °C)   SpO2: 96%   He is well appearing.  Affect is appropriate. His BMI is Body mass index is 33.12 kg/m².. He is awake alert and oriented.  Hearing and vision are grossly intact.   His pupils are equal round reactive to light.  His extraocular movements are intact.  His face is symmetric.  Tongue is midline.  Facial sensation is intact and symmetric throughout.  Shoulder shrug is 5/5.  There is no drift or dysmetria.  He does have bilateral upper extremity tremor including his hands.  He also has a slight head/neck tremor.    He has full strength in his bilateral upper and lower extremities.  He has normal muscle tone muscle bulk.  His biceps reflexes and patellar reflexes are 2+ and symmetric.  Crystal sign negative bilaterally.  Sensation intact to light touch and pinprick throughout.  His gait is normal.    His heart rate is regular.  Normal respiratory effort.  Abdomen nondistended.  Radial pulses 2+.       The following portions of the patient's history were reviewed and updated as appropriate: allergies, current medications, past family history, past medical history, past social history, past surgical history, and problem list.    Active Ambulatory Problems     Diagnosis Date Noted    Cerebral aneurysm, nonruptured 05/20/2024    Tremors of nervous system 05/20/2024    Abnormal computed tomography angiography (CTA) 05/20/2024    Intractable chronic migraine without aura 06/17/2024    Class 1 obesity due to excess calories without serious comorbidity with body mass index (BMI) of 33.0 to 33.9 in adult 06/17/2024    Brain lesion  07/16/2024    Anxiety 07/19/2024     Resolved Ambulatory Problems     Diagnosis Date Noted    No Resolved Ambulatory Problems     Past Medical History:   Diagnosis Date    Abdominal pain     Aneurysm (HCC)     C. difficile colitis     Pilonidal cyst        Past Surgical History:   Procedure Laterality Date    COLONOSCOPY N/A 11/29/2016    Procedure: COLONOSCOPY;  Surgeon: Mono Engel MD;  Location:  GI LAB;  Service:     OCCULT BLOOD, FECAL IMMUNOCHEMICAL (HISTORICAL)      [place] EXCISION PILONIDAL CYST/SINUS SIMPLE N/A 8/28/2018    Procedure: EXCISION PILONIDAL CYST;  Surgeon: Daniel Aparicio DO;  Location:  MAIN OR;  Service: General         Current Outpatient Medications:     ALPRAZolam (XANAX) 0.25 mg tablet, Take 1 tablet (0.25 mg total) by mouth daily at bedtime as needed for anxiety, Disp: 30 tablet, Rfl: 0    MAGNESIUM PO, Take 400 mg by mouth every morning, Disp: , Rfl:     Omega-3 Fatty Acids (FISH OIL PO), Take by mouth, Disp: , Rfl:     sertraline (Zoloft) 100 mg tablet, Take 1 tablet (100 mg total) by mouth daily, Disp: 90 tablet, Rfl: 1    Results/Data: Imaging personally reviewed in detail with patient as well as reports.

## 2024-10-22 ENCOUNTER — OFFICE VISIT (OUTPATIENT)
Dept: FAMILY MEDICINE CLINIC | Facility: CLINIC | Age: 34
End: 2024-10-22
Payer: COMMERCIAL

## 2024-10-22 VITALS
DIASTOLIC BLOOD PRESSURE: 80 MMHG | WEIGHT: 252.8 LBS | HEART RATE: 126 BPM | SYSTOLIC BLOOD PRESSURE: 125 MMHG | OXYGEN SATURATION: 96 % | HEIGHT: 73 IN | BODY MASS INDEX: 33.5 KG/M2 | TEMPERATURE: 97.9 F

## 2024-10-22 DIAGNOSIS — G43.711 INTRACTABLE CHRONIC MIGRAINE WITHOUT AURA AND WITH STATUS MIGRAINOSUS: ICD-10-CM

## 2024-10-22 DIAGNOSIS — F41.9 ANXIETY: Primary | ICD-10-CM

## 2024-10-22 DIAGNOSIS — I67.1 CEREBRAL ANEURYSM, NONRUPTURED: ICD-10-CM

## 2024-10-22 DIAGNOSIS — G93.9 BRAIN LESION: ICD-10-CM

## 2024-10-22 PROCEDURE — 99214 OFFICE O/P EST MOD 30 MIN: CPT | Performed by: INTERNAL MEDICINE

## 2024-10-22 RX ORDER — ALPRAZOLAM 0.25 MG/1
0.25 TABLET ORAL 2 TIMES DAILY PRN
Qty: 60 TABLET | Refills: 0 | Status: SHIPPED | OUTPATIENT
Start: 2024-10-22

## 2024-10-22 RX ORDER — SERTRALINE HYDROCHLORIDE 100 MG/1
100 TABLET, FILM COATED ORAL DAILY
Qty: 30 TABLET | Refills: 5 | Status: SHIPPED | OUTPATIENT
Start: 2024-10-22

## 2024-10-22 NOTE — ASSESSMENT & PLAN NOTE
Continues to have daily almost daily migraines and takes aspirin for he does also seen neurology periodically

## 2024-10-22 NOTE — PROGRESS NOTES
Ambulatory Visit  Name: Vega Troy      : 1990      MRN: [id number]  Encounter Provider: Gail Gary MD  Encounter Date: 10/22/2024   Encounter department: [facility]    Assessment & Plan  Anxiety  Jeromy is having increasing anxiety and depression because he was just fired from his job due to downsizing    Orders:    sertraline (ZOLOFT) 100 mg tablet; Take 1 tablet (100 mg total) by mouth daily    ALPRAZolam (XANAX) 0.25 mg tablet; Take 1 tablet (0.25 mg total) by mouth 2 (two) times a day as needed for anxiety    Brain lesion  And he is still following with this questionable brain lesion/question growth         Intractable chronic migraine without aura and with status migrainosus  Continues to have daily almost daily migraines and takes aspirin for he does also seen neurology periodically         Cerebral aneurysm, nonruptured  Close periodically with neurosurgery even though they think the risk from it is very small.           Tobacco Cessation Counseling: Tobacco cessation counseling was provided. The patient is sincerely urged to quit consumption of tobacco. He is not ready to quit tobacco. Medication options and side effects of medication discussed. Patient refused medication.       History of Present Illness     This have migraines for most of his manages to deal with OTCs Xanax and Zoloft he also is having increasing anxiety and depression because he was found to have a questionable lesion on his MRI of his brain ever after some minor head trauma. Continues to follow with neuro- periodically          Review of Systems   Constitutional:  Negative for chills and fever.   HENT:  Negative for ear pain and sore throat.    Eyes:  Negative for pain and visual disturbance.   Respiratory:  Negative for cough and shortness of breath.    Cardiovascular:  Negative for chest pain and palpitations.   Gastrointestinal:  Negative for abdominal pain and vomiting.   Genitourinary:  Negative for dysuria  "and hematuria.   Musculoskeletal:  Negative for arthralgias and back pain.   Skin:  Negative for color change and rash.   Neurological:  Positive for dizziness and headaches. Negative for tremors, seizures, syncope, speech difficulty, weakness and numbness.   Psychiatric/Behavioral:  Positive for dysphoric mood. Negative for sleep disturbance. The patient is nervous/anxious.    All other systems reviewed and are negative.          Objective     /80   Pulse (!) 126   Temp 97.9 °F (36.6 °C) (Temporal)   Ht 6' 1\" (1.854 m)   Wt 115 kg (252 lb 12.8 oz)   SpO2 96%   BMI 33.35 kg/m²     Physical Exam  Vitals and nursing note reviewed.   Constitutional:       General: He is not in acute distress.     Appearance: He is well-developed.   HENT:      Head: Normocephalic and atraumatic.      Right Ear: Tympanic membrane normal.      Left Ear: Tympanic membrane normal.      Mouth/Throat:      Mouth: Mucous membranes are moist.   Eyes:      Conjunctiva/sclera: Conjunctivae normal.   Cardiovascular:      Rate and Rhythm: Normal rate and regular rhythm.      Heart sounds: No murmur heard.  Pulmonary:      Effort: Pulmonary effort is normal. No respiratory distress.      Breath sounds: Normal breath sounds.   Abdominal:      Palpations: Abdomen is soft.      Tenderness: There is no abdominal tenderness.   Musculoskeletal:         General: No swelling.      Cervical back: Neck supple.   Skin:     General: Skin is warm and dry.      Capillary Refill: Capillary refill takes less than 2 seconds.   Neurological:      General: No focal deficit present.      Mental Status: He is alert. Mental status is at baseline. He is disoriented.   Psychiatric:         Mood and Affect: Mood normal.         "

## 2024-10-22 NOTE — ASSESSMENT & PLAN NOTE
Close periodically with neurosurgery even though they think the risk from it is very small.

## 2024-10-22 NOTE — ASSESSMENT & PLAN NOTE
I have reviewed the notes, assessments, and/or procedures performed by Leslie Avila, I concur with her/his documentation of Kirsten Hernández.     Jeromy is having increasing anxiety and depression because he was just fired from his job due to downsizing    Orders:    sertraline (ZOLOFT) 100 mg tablet; Take 1 tablet (100 mg total) by mouth daily    ALPRAZolam (XANAX) 0.25 mg tablet; Take 1 tablet (0.25 mg total) by mouth 2 (two) times a day as needed for anxiety

## 2024-11-19 ENCOUNTER — TELEPHONE (OUTPATIENT)
Age: 34
End: 2024-11-19

## 2024-11-19 NOTE — TELEPHONE ENCOUNTER
Pt had to cancel his imaging and f/u appt with Ze (11/27) due to losing his job and insurance. Pt will call back to r/s once he has a new job and insurance.

## 2024-12-31 DIAGNOSIS — F41.9 ANXIETY: ICD-10-CM

## 2025-01-01 RX ORDER — SERTRALINE HYDROCHLORIDE 100 MG/1
100 TABLET, FILM COATED ORAL DAILY
Qty: 30 TABLET | Refills: 0 | Status: SHIPPED | OUTPATIENT
Start: 2025-01-01

## 2025-01-02 RX ORDER — ALPRAZOLAM 0.25 MG/1
0.25 TABLET ORAL 2 TIMES DAILY PRN
Qty: 60 TABLET | Refills: 0 | Status: SHIPPED | OUTPATIENT
Start: 2025-01-02

## 2025-01-08 ENCOUNTER — TELEPHONE (OUTPATIENT)
Dept: NEUROSURGERY | Facility: CLINIC | Age: 35
End: 2025-01-08

## 2025-01-16 ENCOUNTER — TELEPHONE (OUTPATIENT)
Dept: NEUROSURGERY | Facility: CLINIC | Age: 35
End: 2025-01-16

## 2025-01-16 NOTE — TELEPHONE ENCOUNTER
Patient replied to my chart message in regards to overdue follow up .    January 16, 2025  Me to Vega CULP      1/16/25 11:36 AM  I apologize for the miscommunication I will be sure to make note of your situation in your chart, and take you off the list of people to call .     This [facility] [employer] message has not been read.  Vega Troy to  [facility] (supporting You)         1/16/25 11:33 AM  Hello,      I understand I am overdue for another MRI and while I appreciate the follow up, I've informed [facility] multiple times that I was laid off and no longer have health insurance at the moment. Once this changes I will reach out to schedule the imaging.     Thank you,   Vega Lamas to Vega CULP      1/16/25 11:26 AM  DATE: 01/16/25        Dear Vega Troy,        Review of our records shows you are due for a follow up appointment with our office along with the following: .     Please contact central scheduling at your convenience at [phone] to schedule your imaging.     Please call the office at [phone], option 1 to schedule your follow-up appointment and/or if you need assistance with scheduling your imaging.      We look forward to hearing from you.        Sincerely,        [facility]       Last read by Vega Troy at 11:29 AM on 1/16/2025.

## 2025-02-24 DIAGNOSIS — E13.9 LADA (LATENT AUTOIMMUNE DIABETES IN ADULTS), MANAGED AS TYPE 1 (HCC): Primary | ICD-10-CM

## 2025-02-27 ENCOUNTER — TELEPHONE (OUTPATIENT)
Dept: FAMILY MEDICINE CLINIC | Facility: CLINIC | Age: 35
End: 2025-02-27

## 2025-02-27 NOTE — TELEPHONE ENCOUNTER
----- Message from Tiesha LOU sent at 2/27/2025 11:57 AM EST -----  Regarding: FW: DM EYE  Please schedule patient physical, he is over due.  ----- Message -----  From: Tiesha Cagle  Sent: 2/27/2025  11:57 AM EST  To: [facility] Practice Clerical  Subject: DM EYE                                           Patient due for DM eye exam, please contact patient to see if patient had a DM eye exam done recently or has one scheduled. (When and where)

## 2025-02-27 NOTE — TELEPHONE ENCOUNTER
Left patient a message to see if he had a diabetic eye exam done and if not if he would like to schedule one in the office. Patient also needs a physical.

## 2025-03-02 DIAGNOSIS — F41.9 ANXIETY: ICD-10-CM

## 2025-03-03 RX ORDER — ALPRAZOLAM 0.25 MG
0.25 TABLET ORAL 2 TIMES DAILY PRN
Qty: 60 TABLET | Refills: 0 | Status: SHIPPED | OUTPATIENT
Start: 2025-03-03

## 2025-04-07 DIAGNOSIS — F41.9 ANXIETY: ICD-10-CM

## 2025-04-07 RX ORDER — SERTRALINE HYDROCHLORIDE 100 MG/1
100 TABLET, FILM COATED ORAL DAILY
Qty: 30 TABLET | Refills: 0 | Status: SHIPPED | OUTPATIENT
Start: 2025-04-07 | End: 2025-04-15

## 2025-04-08 RX ORDER — SERTRALINE HYDROCHLORIDE 100 MG/1
100 TABLET, FILM COATED ORAL DAILY
Qty: 90 TABLET | Refills: 0 | Status: SHIPPED | OUTPATIENT
Start: 2025-04-08

## 2025-04-10 RX ORDER — ALPRAZOLAM 0.25 MG
0.25 TABLET ORAL 2 TIMES DAILY PRN
Qty: 60 TABLET | Refills: 0 | Status: SHIPPED | OUTPATIENT
Start: 2025-04-10

## 2025-04-14 ENCOUNTER — APPOINTMENT (EMERGENCY)
Dept: CT IMAGING | Facility: HOSPITAL | Age: 35
End: 2025-04-14
Payer: COMMERCIAL

## 2025-04-14 ENCOUNTER — HOSPITAL ENCOUNTER (OUTPATIENT)
Facility: HOSPITAL | Age: 35
Setting detail: OBSERVATION
Discharge: HOME | End: 2025-04-15
Attending: EMERGENCY MEDICINE | Admitting: STUDENT IN AN ORGANIZED HEALTH CARE EDUCATION/TRAINING PROGRAM
Payer: COMMERCIAL

## 2025-04-14 ENCOUNTER — APPOINTMENT (EMERGENCY)
Dept: RADIOLOGY | Facility: HOSPITAL | Age: 35
End: 2025-04-14
Payer: COMMERCIAL

## 2025-04-14 DIAGNOSIS — E78.5 HYPERLIPIDEMIA: ICD-10-CM

## 2025-04-14 DIAGNOSIS — I15.9 SECONDARY HYPERTENSION: ICD-10-CM

## 2025-04-14 DIAGNOSIS — R42 VERTIGO: Primary | ICD-10-CM

## 2025-04-14 PROBLEM — E66.812 CLASS 2 OBESITY IN ADULT: Status: ACTIVE | Noted: 2024-06-17

## 2025-04-14 PROBLEM — I10 HYPERTENSION: Status: ACTIVE | Noted: 2025-04-14

## 2025-04-14 LAB
2HR DELTA HS TROPONIN: 1 NG/L
4HR DELTA HS TROPONIN: 0 NG/L
ANION GAP SERPL CALCULATED.3IONS-SCNC: 7 MMOL/L (ref 4–13)
APTT PPP: 29 SECONDS (ref 23–34)
BUN SERPL-MCNC: 12 MG/DL (ref 5–25)
CALCIUM SERPL-MCNC: 9.3 MG/DL (ref 8.4–10.2)
CARDIAC TROPONIN I PNL SERPL HS: 3 NG/L (ref ?–50)
CARDIAC TROPONIN I PNL SERPL HS: 3 NG/L (ref ?–50)
CARDIAC TROPONIN I PNL SERPL HS: 4 NG/L (ref ?–50)
CHLORIDE SERPL-SCNC: 104 MMOL/L (ref 96–108)
CO2 SERPL-SCNC: 27 MMOL/L (ref 21–32)
CREAT SERPL-MCNC: 0.84 MG/DL (ref 0.6–1.3)
ERYTHROCYTE [DISTWIDTH] IN BLOOD BY AUTOMATED COUNT: 12.4 % (ref 11.6–15.1)
GFR SERPL CREATININE-BSD FRML MDRD: 111 ML/MIN/1.73SQ M
GLUCOSE SERPL-MCNC: 110 MG/DL (ref 65–140)
GLUCOSE SERPL-MCNC: 112 MG/DL (ref 65–140)
HCT VFR BLD AUTO: 43.2 % (ref 36.5–49.3)
HGB BLD-MCNC: 14.5 G/DL (ref 12–17)
INR PPP: 0.92 (ref 0.85–1.19)
MCH RBC QN AUTO: 30 PG (ref 26.8–34.3)
MCHC RBC AUTO-ENTMCNC: 33.6 G/DL (ref 31.4–37.4)
MCV RBC AUTO: 89 FL (ref 82–98)
PLATELET # BLD AUTO: 173 THOUSANDS/UL (ref 149–390)
PLATELET # BLD AUTO: 184 THOUSANDS/UL (ref 149–390)
PMV BLD AUTO: 10.3 FL (ref 8.9–12.7)
PMV BLD AUTO: 10.5 FL (ref 8.9–12.7)
POTASSIUM SERPL-SCNC: 3.9 MMOL/L (ref 3.5–5.3)
PROTHROMBIN TIME: 13.1 SECONDS (ref 12.3–15)
RBC # BLD AUTO: 4.84 MILLION/UL (ref 3.88–5.62)
SODIUM SERPL-SCNC: 138 MMOL/L (ref 135–147)
WBC # BLD AUTO: 8.46 THOUSAND/UL (ref 4.31–10.16)

## 2025-04-14 PROCEDURE — 70498 CT ANGIOGRAPHY NECK: CPT

## 2025-04-14 PROCEDURE — 99285 EMERGENCY DEPT VISIT HI MDM: CPT

## 2025-04-14 PROCEDURE — 85027 COMPLETE CBC AUTOMATED: CPT | Performed by: EMERGENCY MEDICINE

## 2025-04-14 PROCEDURE — 93005 ELECTROCARDIOGRAM TRACING: CPT

## 2025-04-14 PROCEDURE — 85730 THROMBOPLASTIN TIME PARTIAL: CPT | Performed by: EMERGENCY MEDICINE

## 2025-04-14 PROCEDURE — 85610 PROTHROMBIN TIME: CPT | Performed by: EMERGENCY MEDICINE

## 2025-04-14 PROCEDURE — 36415 COLL VENOUS BLD VENIPUNCTURE: CPT | Performed by: EMERGENCY MEDICINE

## 2025-04-14 PROCEDURE — 85049 AUTOMATED PLATELET COUNT: CPT | Performed by: STUDENT IN AN ORGANIZED HEALTH CARE EDUCATION/TRAINING PROGRAM

## 2025-04-14 PROCEDURE — 99223 1ST HOSP IP/OBS HIGH 75: CPT | Performed by: STUDENT IN AN ORGANIZED HEALTH CARE EDUCATION/TRAINING PROGRAM

## 2025-04-14 PROCEDURE — 96375 TX/PRO/DX INJ NEW DRUG ADDON: CPT

## 2025-04-14 PROCEDURE — 70496 CT ANGIOGRAPHY HEAD: CPT

## 2025-04-14 PROCEDURE — 71045 X-RAY EXAM CHEST 1 VIEW: CPT

## 2025-04-14 PROCEDURE — 96374 THER/PROPH/DIAG INJ IV PUSH: CPT

## 2025-04-14 PROCEDURE — 82948 REAGENT STRIP/BLOOD GLUCOSE: CPT

## 2025-04-14 PROCEDURE — 84484 ASSAY OF TROPONIN QUANT: CPT | Performed by: EMERGENCY MEDICINE

## 2025-04-14 PROCEDURE — 80048 BASIC METABOLIC PNL TOTAL CA: CPT | Performed by: EMERGENCY MEDICINE

## 2025-04-14 PROCEDURE — 99285 EMERGENCY DEPT VISIT HI MDM: CPT | Performed by: EMERGENCY MEDICINE

## 2025-04-14 RX ORDER — ASPIRIN 81 MG/1
324 TABLET, CHEWABLE ORAL ONCE
Status: COMPLETED | OUTPATIENT
Start: 2025-04-14 | End: 2025-04-14

## 2025-04-14 RX ORDER — ENOXAPARIN SODIUM 100 MG/ML
40 INJECTION SUBCUTANEOUS DAILY
Status: DISCONTINUED | OUTPATIENT
Start: 2025-04-15 | End: 2025-04-15 | Stop reason: HOSPADM

## 2025-04-14 RX ORDER — CLOPIDOGREL 300 MG/1
600 TABLET, FILM COATED ORAL ONCE
Status: COMPLETED | OUTPATIENT
Start: 2025-04-14 | End: 2025-04-14

## 2025-04-14 RX ORDER — MAGNESIUM SULFATE HEPTAHYDRATE 40 MG/ML
2 INJECTION, SOLUTION INTRAVENOUS ONCE
Status: COMPLETED | OUTPATIENT
Start: 2025-04-14 | End: 2025-04-14

## 2025-04-14 RX ORDER — METOCLOPRAMIDE HYDROCHLORIDE 5 MG/ML
10 INJECTION INTRAMUSCULAR; INTRAVENOUS ONCE
Status: COMPLETED | OUTPATIENT
Start: 2025-04-14 | End: 2025-04-14

## 2025-04-14 RX ORDER — DIAZEPAM 10 MG/2ML
5 INJECTION, SOLUTION INTRAMUSCULAR; INTRAVENOUS ONCE
Status: COMPLETED | OUTPATIENT
Start: 2025-04-14 | End: 2025-04-14

## 2025-04-14 RX ORDER — SERTRALINE HYDROCHLORIDE 100 MG/1
100 TABLET, FILM COATED ORAL DAILY
Status: DISCONTINUED | OUTPATIENT
Start: 2025-04-15 | End: 2025-04-15 | Stop reason: HOSPADM

## 2025-04-14 RX ORDER — ACETAMINOPHEN 10 MG/ML
1000 INJECTION, SOLUTION INTRAVENOUS ONCE
Status: COMPLETED | OUTPATIENT
Start: 2025-04-14 | End: 2025-04-14

## 2025-04-14 RX ORDER — ATORVASTATIN CALCIUM 40 MG/1
40 TABLET, FILM COATED ORAL EVERY EVENING
Status: DISCONTINUED | OUTPATIENT
Start: 2025-04-14 | End: 2025-04-15 | Stop reason: HOSPADM

## 2025-04-14 RX ORDER — MECLIZINE HYDROCHLORIDE 25 MG/1
50 TABLET ORAL ONCE
Status: COMPLETED | OUTPATIENT
Start: 2025-04-14 | End: 2025-04-14

## 2025-04-14 RX ORDER — CLOPIDOGREL BISULFATE 75 MG/1
75 TABLET ORAL DAILY
Status: DISCONTINUED | OUTPATIENT
Start: 2025-04-15 | End: 2025-04-15 | Stop reason: HOSPADM

## 2025-04-14 RX ORDER — MECLIZINE HYDROCHLORIDE 25 MG/1
25 TABLET ORAL EVERY 8 HOURS PRN
Status: DISCONTINUED | OUTPATIENT
Start: 2025-04-14 | End: 2025-04-15 | Stop reason: HOSPADM

## 2025-04-14 RX ORDER — ALPRAZOLAM 0.25 MG
0.25 TABLET ORAL 2 TIMES DAILY PRN
Status: DISCONTINUED | OUTPATIENT
Start: 2025-04-14 | End: 2025-04-15 | Stop reason: HOSPADM

## 2025-04-14 RX ADMIN — IOHEXOL 85 ML: 350 INJECTION, SOLUTION INTRAVENOUS at 17:53

## 2025-04-14 RX ADMIN — ASPIRIN 324 MG: 81 TABLET, CHEWABLE ORAL at 18:14

## 2025-04-14 RX ADMIN — ACETAMINOPHEN 1000 MG: 10 INJECTION INTRAVENOUS at 18:24

## 2025-04-14 RX ADMIN — MAGNESIUM SULFATE HEPTAHYDRATE 2 G: 40 INJECTION, SOLUTION INTRAVENOUS at 18:36

## 2025-04-14 RX ADMIN — SODIUM CHLORIDE 1000 ML: 0.9 INJECTION, SOLUTION INTRAVENOUS at 18:28

## 2025-04-14 RX ADMIN — CLOPIDOGREL BISULFATE 600 MG: 300 TABLET, FILM COATED ORAL at 18:16

## 2025-04-14 RX ADMIN — METOCLOPRAMIDE 10 MG: 5 INJECTION, SOLUTION INTRAMUSCULAR; INTRAVENOUS at 18:18

## 2025-04-14 RX ADMIN — ATORVASTATIN CALCIUM 40 MG: 40 TABLET, FILM COATED ORAL at 20:13

## 2025-04-14 RX ADMIN — MECLIZINE HYDROCHLORIDE 50 MG: 25 TABLET ORAL at 18:17

## 2025-04-14 RX ADMIN — DIAZEPAM 5 MG: 5 INJECTION, SOLUTION INTRAMUSCULAR; INTRAVENOUS at 18:18

## 2025-04-14 NOTE — ED PROVIDER NOTES
Time reflects when diagnosis was documented in both MDM as applicable and the Disposition within this note       Time User Action Codes Description Comment    4/14/2025  5:31 PM Maddie Sparrow Add [R42] Vertigo           ED Disposition       None          Assessment & Plan       Medical Decision Making  Patient is a 35-year-old male past medical history of cerebral aneurysm, migraine, diabetes, intracranial llama glioma presenting for vertigo.  Patient is ill-appearing at bedside currently hypertensive with unsteady gait and falls to the left however with no other neurologic deficits.  Have called stroke alert and patient is NIH of 0 have discussed with neurology due to intracranial neoplasm would not be a candidate for TNK.  Will obtain stroke workup to rule out hemorrhage, edema, mass, other intracranial pathology, labs to assess for electrolyte abnormalities, anemia, EKG and opponent to assess for ACS or arrhythmia, give symptomatic management reassess.    Amount and/or Complexity of Data Reviewed  Labs: ordered.  Radiology: ordered.    Risk  OTC drugs.  Prescription drug management.             Medications   sodium chloride 0.9 % bolus 1,000 mL (has no administration in time range)   diazepam (VALIUM) injection 5 mg (has no administration in time range)   meclizine (ANTIVERT) tablet 50 mg (has no administration in time range)   metoclopramide (REGLAN) injection 10 mg (has no administration in time range)   magnesium sulfate 2 g/50 mL IVPB (premix) 2 g (has no administration in time range)   acetaminophen (Ofirmev) injection 1,000 mg (has no administration in time range)       ED Risk Strat Scores                    No data recorded        SBIRT 22yo+      Flowsheet Row Most Recent Value   Initial Alcohol Screen: US AUDIT-C     1. How often do you have a drink containing alcohol? 0 Filed at: 04/14/2025 1721   2. How many drinks containing alcohol do you have on a typical day you are drinking?  0 Filed at:  04/14/2025 1721   3a. Male UNDER 65: How often do you have five or more drinks on one occasion? 0 Filed at: 04/14/2025 1721   3b. FEMALE Any Age, or MALE 65+: How often do you have 4 or more drinks on one occassion? 0 Filed at: 04/14/2025 1721   Audit-C Score 0 Filed at: 04/14/2025 1721   JENIFER: How many times in the past year have you...    Used an illegal drug or used a prescription medication for non-medical reasons? Never Filed at: 04/14/2025 1721                            History of Present Illness       Chief Complaint   Patient presents with    Dizziness     Pt reports Saturday morning he started with room spinning dizziness and nausea. Pt reports it went away yesterday then became intermittent last night. Now constant and can't open his eyes.        Past Medical History:   Diagnosis Date    Abdominal pain     Aneurysm (HCC)     Fusiform aneurysmal dilatation    C. difficile colitis     2011    Pilonidal cyst       Past Surgical History:   Procedure Laterality Date    COLONOSCOPY N/A 11/29/2016    Procedure: COLONOSCOPY;  Surgeon: Mono Engel MD;  Location: [facility];  Service:     OCCULT BLOOD, FECAL IMMUNOCHEMICAL (HISTORICAL)      NE EXCISION PILONIDAL CYST/SINUS SIMPLE N/A 8/28/2018    Procedure: EXCISION PILONIDAL CYST;  Surgeon: Daniel Aparicio DO;  Location: [facility];  Service: General      Family History   Problem Relation Age of Onset    Asthma Mother     Arthritis Mother     Heart failure Mother     Asthma Paternal Grandmother     Arthritis Paternal Grandmother     Alzheimer's disease Family       Social History     Tobacco Use    Smoking status: Former     Types: Cigarettes     Start date: 2018    Smokeless tobacco: Former     Types: Chew    Tobacco comments:     Nicotine packs   Vaping Use    Vaping status: Every Day    Start date: 2/1/2018    Substances: Nicotine, THC   Substance Use Topics    Alcohol use: Not Currently     Comment: occ    Drug use: Yes     Types: Marijuana     Comment:  Medical Marijuana Card      E-Cigarette/Vaping    E-Cigarette Use Current Every Day User     Start Date 2/1/18       E-Cigarette/Vaping Substances    Nicotine Yes     THC Yes     CBD No     Flavoring No     Other No     Unknown No       I have reviewed and agree with the history as documented.     Patient is a 35-year-old male past medical history of intracranial aneurysm, migraine, diabetes presenting for vertigo.  Patient notes vertigo that was constant 3 days ago, then resolved, became intermittent last night and has been constant for the last 4 hours.  He notes nausea and 2 episodes of vomiting, took Zofran and meclizine with no relief.  Notes posterior headache which he states is chronic for him but states he has never had the vertigo before.  He denies any abdominal pain, numbness or tingling or weakness, shortness of breath, head injuries, fevers, shortness of breath, rashes, dysuria.  Patient notes constant central chest pain over the last 2 days which she states began with vomiting and is central nonradiating.  Is unsure whether or not he has had vision changes he states he cannot open his eyes due to severe vertigo.        Review of Systems   All other systems reviewed and are negative.          Objective       ED Triage Vitals [04/14/25 1718]   Temperature Pulse Blood Pressure Respirations SpO2 Patient Position - Orthostatic VS   97.8 °F (36.6 °C) 80 (!) 186/115 20 97 % Sitting      Temp Source Heart Rate Source BP Location FiO2 (%) Pain Score    Temporal Monitor Left arm -- --      Vitals      Date and Time Temp Pulse SpO2 Resp BP Pain Score FACES Pain Rating User   04/14/25 1718 97.8 °F (36.6 °C) 80 97 % 20 186/115 -- -- LA            Physical Exam  Vitals reviewed.   Constitutional:       General: He is not in acute distress.     Appearance: Normal appearance. He is not ill-appearing.   HENT:      Mouth/Throat:      Mouth: Mucous membranes are moist.   Eyes:      Extraocular Movements: Extraocular  movements intact.      Conjunctiva/sclera: Conjunctivae normal.      Pupils: Pupils are equal, round, and reactive to light.   Cardiovascular:      Rate and Rhythm: Normal rate and regular rhythm.      Pulses: Normal pulses.      Heart sounds: Normal heart sounds.   Pulmonary:      Effort: Pulmonary effort is normal.      Breath sounds: Normal breath sounds.   Abdominal:      General: Abdomen is flat.      Palpations: Abdomen is soft.      Tenderness: There is no abdominal tenderness.   Musculoskeletal:         General: No swelling. Normal range of motion.      Cervical back: Neck supple.      Right lower leg: No edema.      Left lower leg: No edema.   Skin:     General: Skin is warm and dry.   Neurological:      General: No focal deficit present.      Mental Status: He is alert.      Cranial Nerves: No cranial nerve deficit.      Sensory: No sensory deficit.      Motor: No weakness.      Coordination: Coordination normal.      Comments: Falls to the left with attempts to ambulate   Psychiatric:         Mood and Affect: Mood normal.         Results Reviewed       Procedure Component Value Units Date/Time    Basic metabolic panel [[id number]]     Lab Status: No result Specimen: Blood     CBC and Platelet [[id number]]     Lab Status: No result Specimen: Blood     Protime-INR [[id number]]     Lab Status: No result Specimen: Blood     APTT [[id number]]     Lab Status: No result Specimen: Blood     HS Troponin 0hr (reflex protocol) [[id number]]     Lab Status: No result Specimen: Blood             CT stroke alert brain    (Results Pending)   CTA stroke alert (head/neck)    (Results Pending)   X-ray chest 1 view portable    (Results Pending)       ECG 12 Lead Documentation Only    Date/Time: 4/14/2025 6:03 PM    Performed by: Maddie Sparrow DO  Authorized by: Maddie Sparrow DO    Patient location:  ED  Previous ECG:     Previous ECG:  Unavailable  Interpretation:     Interpretation: normal    Rate:     ECG rate  assessment: normal    Rhythm:     Rhythm: sinus rhythm    Ectopy:     Ectopy: none    QRS:     QRS axis:  Normal    QRS intervals:  Normal  Conduction:     Conduction: normal    ST segments:     ST segments:  Normal  T waves:     T waves: normal        ED Medication and Procedure Management   Prior to Admission Medications   Prescriptions Last Dose Informant Patient Reported? Taking?   ALPRAZolam (XANAX) 0.25 mg tablet  Self No No   Sig: Take 1 tablet (0.25 mg total) by mouth daily at bedtime as needed for anxiety   Patient not taking: Reported on 10/22/2024   ALPRAZolam (XANAX) 0.25 mg tablet   No No   Sig: Take 1 tablet (0.25 mg total) by mouth 2 (two) times a day as needed for anxiety   MAGNESIUM PO  Self Yes No   Sig: Take 400 mg by mouth every morning   Omega-3 Fatty Acids (FISH OIL PO)   Yes No   Sig: Take by mouth   sertraline (ZOLOFT) 100 mg tablet   No No   Sig: Take 1 tablet (100 mg total) by mouth daily   sertraline (ZOLOFT) 100 mg tablet   No No   Sig: TAKE 1 TABLET BY MOUTH EVERY DAY      Facility-Administered Medications: None     Patient's Medications   Discharge Prescriptions    No medications on file     No discharge procedures on file.  ED SEPSIS DOCUMENTATION   Time reflects when diagnosis was documented in both MDM as applicable and the Disposition within this note       Time User Action Codes Description Comment    4/14/2025  5:31 PM Maddie Sparrow [R42] Vertigo                  Maddie Sparrow,   04/14/25 2050

## 2025-04-14 NOTE — QUICK NOTE
Stroke Alert    Called 5:34pm    34 y/o M with possible low-grade R thalamic neoplasm (identified last year) that presents with intermittent then constant vertigo. Pt reports he had a bout of vertigo lasting hours 2 days ago then it resolved; he was fine until yesterday afternoon then began having intermittent bouts of vertigo that would resolve in between. He again was fine today until around 1:30pm he began having severe vertigo associated with nausea/vomiting and being unable to ambulate. This remained persistent and he came to the ED. NIHSS 0 with no objective deficits but pt is unable to ambulate and immediately falls to the L when attempting to. /118.    HCT and CTA were personally reviewed - stable R thalamic lesion; no clear acute ischemia/hemorrhage; no significant vascular abnormality.    Not a TNK candidate given suspicion for intracranial neoplasm and risk for hemorrhage. Given significantly elevated BP and inability to ambulate, cannot rule out a central etiology for his vertigo though the pattern is atypical for this. Can admit to stroke pathway with DAPT loads (325mg ASA and 600mg Plavix). Given pt was supposed to obtain follow up MRI imaging for his suspected neoplasm months prior and did not, can obtain MRI brain w/wo contrast.

## 2025-04-14 NOTE — ASSESSMENT & PLAN NOTE
low-grade right thalamic neoplasm presented with intermittent than constant vertigo.  Patient reported he had a bout of vertigo lasting hours 2 days ago and then it resolved.  He was fine until yesterday afternoon that began having intermittent bouts of vertigo that resolved in between.  He again was fine today until around 1:30 PM he began having severe vertigo associated with nausea/vomiting and being unable to ambulate this remained persistent and came to the ED. patient reported his vertigo got improved with meclizine.  On exam, patient does not have any nystagmus and other cerebellar signs cranial nerves are intact  CT head and neck showed no stenosis, dissection or occlusion of the carotid or vertebral arteries or major vessels of [place].  ED reached out to neurology recommended loading dose of aspirin and Plavix and admit patient on stroke pathway.  Started aspirin, Plavix, Lipitor, glycohemoglobin, lipid profile  PT OT  Meclizine every 8 hours as needed  Permissive hypertension  IV hydralazine if systolic blood pressure is more than 220 mmHg

## 2025-04-14 NOTE — ASSESSMENT & PLAN NOTE
Recommend incorporating a more whole foods plant-predominant diet along with decreasing consumption of red meats and processed foods  Per AHA guidelines, recommend moderate-vigorous intensity exercise for 30 minutes a day for 5 days a week or a total of 150 min/week

## 2025-04-14 NOTE — H&P
H&P - Hospitalist   Name: Vega Troy 35 y.o. male I MRN: [id number]  Unit/Bed#: ED 18 I Date of Admission: 4/14/2025   Date of Service: 4/14/2025 I Hospital Day: 0     Assessment & Plan  Class 2 obesity in adult  Recommend incorporating a more whole foods plant-predominant diet along with decreasing consumption of red meats and processed foods  Per AHA guidelines, recommend moderate-vigorous intensity exercise for 30 minutes a day for 5 days a week or a total of 150 min/week     Brain lesion  Last MRI on 8/30/2024 showed   stable size of the T2 hyperintense 9 mm right thalamic lesion. There is the suggestion of minimal peripheral enhancement on thin section postcontrast images. No nodular enhancement seen. As discussed previously, a low-grade glial neoplasm is   suspected. A prominent perivascular space is a less likely at consideration. Subacute to chronic lacunar type infarct could potentially appear similar.   Vertigo  low-grade right thalamic neoplasm presented with intermittent than constant vertigo.  Patient reported he had a bout of vertigo lasting hours 2 days ago and then it resolved.  He was fine until yesterday afternoon that began having intermittent bouts of vertigo that resolved in between.  He again was fine today until around 1:30 PM he began having severe vertigo associated with nausea/vomiting and being unable to ambulate this remained persistent and came to the ED. patient reported his vertigo got improved with meclizine.  On exam, patient does not have any nystagmus and other cerebellar signs cranial nerves are intact  CT head and neck showed no stenosis, dissection or occlusion of the carotid or vertebral arteries or major vessels of [place].  ED reached out to neurology recommended loading dose of aspirin and Plavix and admit patient on stroke pathway.  Started aspirin, Plavix, Lipitor, glycohemoglobin, lipid profile  PT OT  Meclizine every 8 hours as needed  Permissive  hypertension  IV hydralazine if systolic blood pressure is more than 220 mmHg  Hypertension  Permissive hypertension      VTE Pharmacologic Prophylaxis:   Moderate Risk (Score 3-4) - Pharmacological DVT Prophylaxis Ordered: heparin.  Code Status: Level 1 - Full Code   Discussion with family: Updated  (wife) at bedside.    Anticipated Length of Stay: Patient will be admitted on an observation basis with an anticipated length of stay of less than 2 midnights secondary to vertigo.    History of Present Illness   Chief Complaint: Intermittent vertigo for 2 days    Vega Troy is a 35 y.o. male with a PMH of low-grade right thalamic neoplasm presented with intermittent than constant vertigo.  Patient reported he had a bout of vertigo lasting hours 2 days ago and then it resolved.  He was fine until yesterday afternoon that began having intermittent bouts of vertigo that resolved in between.  He again was fine today until around 1:30 PM he began having severe vertigo associated with nausea/vomiting and being unable to ambulate this remained persistent and came to the ED. patient reported his vertigo got improved with meclizine,      Patient reported had 3-4 episodes of diarrhea for 2 days without any abdominal pain.  He also denied any recent upper respite tract infection he did not take any over-the-counter medication recently.          Review of Systems   Constitutional: Negative.    Eyes: Negative.    Respiratory: Negative.     Cardiovascular: Negative.    Gastrointestinal: Negative.    Endocrine: Negative.    Genitourinary: Negative.    Musculoskeletal: Negative.    Skin: Negative.    Neurological:  Positive for dizziness and light-headedness.       Historical Information   Past Medical History:   Diagnosis Date    Abdominal pain     Aneurysm (HCC)     Fusiform aneurysmal dilatation    C. difficile colitis     2011    Pilonidal cyst      Past Surgical History:   Procedure Laterality Date    COLONOSCOPY  N/A 11/29/2016    Procedure: COLONOSCOPY;  Surgeon: Mono Engel MD;  Location: [facility];  Service:     OCCULT BLOOD, FECAL IMMUNOCHEMICAL (HISTORICAL)      MD EXCISION PILONIDAL CYST/SINUS SIMPLE N/A 8/28/2018    Procedure: EXCISION PILONIDAL CYST;  Surgeon: Daniel Aparicio DO;  Location: [facility];  Service: General     Social History     Tobacco Use    Smoking status: Former     Types: Cigarettes     Start date: 2018    Smokeless tobacco: Former     Types: Chew    Tobacco comments:     Nicotine packs   Vaping Use    Vaping status: Every Day    Start date: 2/1/2018    Substances: Nicotine, THC   Substance and Sexual Activity    Alcohol use: Not Currently     Comment: occ    Drug use: Yes     Types: Marijuana     Comment: Medical Marijuana Card    Sexual activity: Not on file     E-Cigarette/Vaping    E-Cigarette Use Current Every Day User     Start Date 2/1/18      E-Cigarette/Vaping Substances    Nicotine Yes     THC Yes     CBD No     Flavoring No     Other No     Unknown No      Family history non-contributory  Social History:  Marital Status: /Civil Union   Patient Pre-hospital Living Situation: Home  Patient Pre-hospital Level of Mobility: walks  Patient Pre-hospital Diet Restrictions: none    Meds/Allergies   I have reviewed home medications using recent Epic encounter.  Prior to Admission medications    Medication Sig Start Date End Date Taking? Authorizing Provider   ALPRAZolam (XANAX) 0.25 mg tablet Take 1 tablet (0.25 mg total) by mouth daily at bedtime as needed for anxiety  Patient not taking: Reported on 10/22/2024 7/19/24   Gail Gary MD   ALPRAZolam (XANAX) 0.25 mg tablet Take 1 tablet (0.25 mg total) by mouth 2 (two) times a day as needed for anxiety 4/10/25   Gail Gary MD   MAGNESIUM PO Take 400 mg by mouth every morning    Historical Provider, MD   Omega-3 Fatty Acids (FISH OIL PO) Take by mouth    Historical Provider, MD   sertraline (ZOLOFT) 100 mg tablet Take 1 tablet (100 mg  "total) by mouth daily 4/7/25   Gail Gary MD   sertraline (ZOLOFT) 100 mg tablet TAKE 1 TABLET BY MOUTH EVERY DAY 4/8/25   Gail Gary MD     No Known Allergies    Objective :  Temp:  [97.8 °F (36.6 °C)] 97.8 °F (36.6 °C)  HR:  [65-80] 73  BP: (148-210)/() 148/85  Resp:  [17-20] 20  SpO2:  [96 %-98 %] 96 %  O2 Device: None (Room air)    Physical Exam  Constitutional:       Appearance: Normal appearance.   HENT:      Head: Normocephalic and atraumatic.      Nose: Nose normal.      Mouth/Throat:      Mouth: Mucous membranes are moist.   Eyes:      Pupils: Pupils are equal, round, and reactive to light.   Cardiovascular:      Rate and Rhythm: Normal rate.      Pulses: Normal pulses.   Pulmonary:      Effort: Pulmonary effort is normal.   Abdominal:      Palpations: Abdomen is soft.   Musculoskeletal:         General: Normal range of motion.      Cervical back: Normal range of motion.   Skin:     General: Skin is warm.      Capillary Refill: Capillary refill takes less than 2 seconds.   Neurological:      General: No focal deficit present.      Mental Status: He is alert.      Cranial Nerves: No cranial nerve deficit.      Motor: No weakness.   Psychiatric:         Mood and Affect: Mood normal.          Lines/Drains:            Lab Results: I have reviewed the following results:  Results from last 7 days   Lab Units 04/14/25  1755   WBC Thousand/uL 8.46   HEMOGLOBIN g/dL 14.5   HEMATOCRIT % 43.2   PLATELETS Thousands/uL 173     Results from last 7 days   Lab Units 04/14/25  1755   SODIUM mmol/L 138   POTASSIUM mmol/L 3.9   CHLORIDE mmol/L 104   CO2 mmol/L 27   BUN mg/dL 12   CREATININE mg/dL 0.84   ANION GAP mmol/L 7   CALCIUM mg/dL 9.3   GLUCOSE RANDOM mg/dL 112     Results from last 7 days   Lab Units 04/14/25  1755   INR  0.92     Results from last 7 days   Lab Units 04/14/25  1800   POC GLUCOSE mg/dl 110     No results found for: \"HGBA1C\"        Imaging Results Review: I reviewed radiology reports from this " admission including: CT head.  Other Study Results Review: EKG was reviewed.     Administrative Statements   I have spent a total time of 89 minutes in caring for this patient on the day of the visit/encounter including Counseling / Coordination of care, Documenting in the medical record, Reviewing/placing orders in the medical record (including tests, medications, and/or procedures), Obtaining or reviewing history  , and Communicating with other healthcare professionals .    ** Please Note: This note has been constructed using a voice recognition system. **

## 2025-04-14 NOTE — ASSESSMENT & PLAN NOTE
Last MRI on 8/30/2024 showed   stable size of the T2 hyperintense 9 mm right thalamic lesion. There is the suggestion of minimal peripheral enhancement on thin section postcontrast images. No nodular enhancement seen. As discussed previously, a low-grade glial neoplasm is   suspected. A prominent perivascular space is a less likely at consideration. Subacute to chronic lacunar type infarct could potentially appear similar.

## 2025-04-15 ENCOUNTER — TRANSITIONAL CARE MANAGEMENT (OUTPATIENT)
Dept: FAMILY MEDICINE CLINIC | Facility: CLINIC | Age: 35
End: 2025-04-15

## 2025-04-15 ENCOUNTER — APPOINTMENT (OUTPATIENT)
Dept: NON INVASIVE DIAGNOSTICS | Facility: HOSPITAL | Age: 35
End: 2025-04-15
Payer: COMMERCIAL

## 2025-04-15 ENCOUNTER — APPOINTMENT (OUTPATIENT)
Dept: MRI IMAGING | Facility: HOSPITAL | Age: 35
End: 2025-04-15
Payer: COMMERCIAL

## 2025-04-15 VITALS
TEMPERATURE: 97.9 F | WEIGHT: 259.8 LBS | SYSTOLIC BLOOD PRESSURE: 179 MMHG | DIASTOLIC BLOOD PRESSURE: 125 MMHG | RESPIRATION RATE: 18 BRPM | BODY MASS INDEX: 34.43 KG/M2 | HEART RATE: 79 BPM | OXYGEN SATURATION: 95 % | HEIGHT: 73 IN

## 2025-04-15 PROBLEM — E78.5 HYPERLIPIDEMIA: Status: ACTIVE | Noted: 2025-04-15

## 2025-04-15 LAB
ATRIAL RATE: 70 BPM
CHOLEST SERPL-MCNC: 213 MG/DL (ref ?–200)
EST. AVERAGE GLUCOSE BLD GHB EST-MCNC: 111 MG/DL
HBA1C MFR BLD: 5.5 %
HDLC SERPL-MCNC: 38 MG/DL
LDLC SERPL CALC-MCNC: 121 MG/DL (ref 0–100)
P AXIS: 25 DEGREES
PR INTERVAL: 182 MS
QRS AXIS: 56 DEGREES
QRSD INTERVAL: 98 MS
QT INTERVAL: 418 MS
QTC INTERVAL: 451 MS
T WAVE AXIS: 46 DEGREES
TRIGL SERPL-MCNC: 272 MG/DL (ref ?–150)
VENTRICULAR RATE: 70 BPM

## 2025-04-15 PROCEDURE — A9585 GADOBUTROL INJECTION: HCPCS | Performed by: INTERNAL MEDICINE

## 2025-04-15 PROCEDURE — 92610 EVALUATE SWALLOWING FUNCTION: CPT

## 2025-04-15 PROCEDURE — 83036 HEMOGLOBIN GLYCOSYLATED A1C: CPT | Performed by: STUDENT IN AN ORGANIZED HEALTH CARE EDUCATION/TRAINING PROGRAM

## 2025-04-15 PROCEDURE — 70553 MRI BRAIN STEM W/O & W/DYE: CPT

## 2025-04-15 PROCEDURE — 93010 ELECTROCARDIOGRAM REPORT: CPT | Performed by: INTERNAL MEDICINE

## 2025-04-15 PROCEDURE — 80061 LIPID PANEL: CPT | Performed by: STUDENT IN AN ORGANIZED HEALTH CARE EDUCATION/TRAINING PROGRAM

## 2025-04-15 PROCEDURE — 99239 HOSP IP/OBS DSCHRG MGMT >30: CPT

## 2025-04-15 RX ORDER — ONDANSETRON 4 MG/1
4 TABLET, FILM COATED ORAL EVERY 8 HOURS PRN
Qty: 20 TABLET | Refills: 0 | Status: SHIPPED | OUTPATIENT
Start: 2025-04-15

## 2025-04-15 RX ORDER — ACETAMINOPHEN 325 MG/1
650 TABLET ORAL EVERY 6 HOURS PRN
Status: DISCONTINUED | OUTPATIENT
Start: 2025-04-15 | End: 2025-04-15 | Stop reason: HOSPADM

## 2025-04-15 RX ORDER — MECLIZINE HYDROCHLORIDE 25 MG/1
25 TABLET ORAL EVERY 8 HOURS PRN
Qty: 30 TABLET | Refills: 0 | Status: SHIPPED | OUTPATIENT
Start: 2025-04-15

## 2025-04-15 RX ORDER — ATORVASTATIN CALCIUM 20 MG/1
20 TABLET, FILM COATED ORAL DAILY
Qty: 30 TABLET | Refills: 0 | Status: SHIPPED | OUTPATIENT
Start: 2025-04-15

## 2025-04-15 RX ORDER — LISINOPRIL 2.5 MG/1
2.5 TABLET ORAL DAILY
Qty: 30 TABLET | Refills: 0 | Status: SHIPPED | OUTPATIENT
Start: 2025-04-15

## 2025-04-15 RX ORDER — LISINOPRIL 2.5 MG/1
2.5 TABLET ORAL DAILY
Status: DISCONTINUED | OUTPATIENT
Start: 2025-04-15 | End: 2025-04-15 | Stop reason: HOSPADM

## 2025-04-15 RX ORDER — GADOBUTROL 604.72 MG/ML
11 INJECTION INTRAVENOUS
Status: COMPLETED | OUTPATIENT
Start: 2025-04-15 | End: 2025-04-15

## 2025-04-15 RX ADMIN — CLOPIDOGREL 75 MG: 75 TABLET ORAL at 09:36

## 2025-04-15 RX ADMIN — ALPRAZOLAM 0.25 MG: 0.25 TABLET ORAL at 06:11

## 2025-04-15 RX ADMIN — LISINOPRIL 2.5 MG: 2.5 TABLET ORAL at 15:04

## 2025-04-15 RX ADMIN — ACETAMINOPHEN 650 MG: 325 TABLET, FILM COATED ORAL at 06:40

## 2025-04-15 RX ADMIN — GADOBUTROL 11 ML: 604.72 INJECTION INTRAVENOUS at 12:54

## 2025-04-15 RX ADMIN — ENOXAPARIN SODIUM 40 MG: 40 INJECTION SUBCUTANEOUS at 09:36

## 2025-04-15 RX ADMIN — SERTRALINE 100 MG: 100 TABLET, FILM COATED ORAL at 09:36

## 2025-04-15 NOTE — ASSESSMENT & PLAN NOTE
"Patient is a 35 year old male with right thalamic lesion and R ICA non-ruptured aneurysm, anxiety, intractable migraine w/o aura, latent autoimmune diabetes, and tremor who presented to ED 4/14 evening as a stroke alert with intermittent vertigo since Saturday, which worsened yesterday with difficulty ambulating. Episode lasted for over 4 hours. Patient noted to have blood pressure 210/118 on arrival to ED. Given 1L NSS bolus, valium 5mg IV, reglan 10 mg IV, meclizine 50mg PO, magnesium 2g IV, tylenol 1000mg IV in ED, possible improvement as he was transported to med-surg floor. Neurology is consulted for intermittent vertigo in setting of intracranial lesion.     Workup:   Stroke alert  blood pressure 210/118 on arrival  CT head- \"Stable right thalamic lesion no evidence of acute vascular territorial infarction or intracranial hemorrhage.\"  CTA head and neck:  \"No stenosis, dissection or occlusion of the carotid or vertebral arteries or major vessels of the [place].\"  NIH 0 on arrival  Patient is not a TNK candidate given intracranial lesion and risk of hemorrhage  DAPT loading: asa 324mg & plavix 600mg, followed by plavix 75mg daily        MRI brain negative for acute infarct, \"Similar small round nonenhancing lesion in right posteromedial thalamus, suspicious for low-grade glioma. \"  Hba1c (4/15/25): 5.5  Lipids panel (4/15/25):   cholesterol 213  Triglycerides 272  HDL 38          Impression:   Etiology for vertigo likely peripheral vestibulopathy versus vestibular migraine,     Plan:  Can discontinue stroke pathway  Can discontinue echo  Can discontinue statin and plavix  Patient does not need outpatient neurology followup; can followup outpatient with ENT  Recommend PRN meclizine  "

## 2025-04-15 NOTE — DISCHARGE SUMMARY
Discharge Summary - Hospitalist   Name: Vega Troy 35 y.o. male I MRN: [id number]  Unit/Bed#: 2 E 268-01 I Date of Admission: 4/14/2025   Date of Service: 4/15/2025 I Hospital Day: 0     Assessment & Plan  Vertigo  Patient reported he had a bout of vertigo lasting hours 2 days ago and then it resolved.  He was fine until yesterday afternoon that began having intermittent bouts of vertigo that resolved in between.  He again was fine today until around 1:30 PM he began having severe vertigo associated with nausea/vomiting and being unable to ambulate this remained persistent and came to the ED. On exam, patient does not have any nystagmus and other cerebellar signs cranial nerves are intact  Admitted on stroke pathway, stroke now ruled out  Neurology consulted  CT head and neck showed no stenosis, dissection or occlusion of the carotid or vertebral arteries or major vessels of [place].  MRI negative for acute stroke  Received plavix, ASA load, started statin  Plavix now discontinued  Symptoms improved with IV Mag, Valium 5mg, Reglan  Meclizine every 8 hours as needed  Will initiate low dose antihypertensive as BP remains elevated this admission  Patient stable for discharge, will follow-up with PCP outpatient  Brain lesion  Last MRI on 8/30/2024 showed stable size of the T2 hyperintense 9 mm right thalamic lesion. There is the suggestion of minimal peripheral enhancement on thin section postcontrast images. No nodular enhancement seen. As discussed previously, a low-grade glial neoplasm is suspected. A prominent perivascular space is a less likely at consideration. Subacute to chronic lacunar type infarct could potentially appear similar.   MRI 4/15:   No acute intracranial abnormality.Similar small round nonenhancing lesion in right posteromedial thalamus, suspicious for low-grade glioma  Continue outpatient monitoring with Neurosurgery  Class 2 obesity in adult  Recommend incorporating a more whole foods  plant-predominant diet along with decreasing consumption of red meats and processed foods  Per AHA guidelines, recommend moderate-vigorous intensity exercise for 30 minutes a day for 5 days a week or a total of 150 min/week     Hypertension  Bp elevated this admission, not on antihypertensive outpatient  Will start low dose lisinopril  Patient to f/u with PCP for BP check and medication adjust as indicated      Medical Problems       Resolved Problems  Date Reviewed: 11/4/2016   None       Discharging Physician / Practitioner: MARCELLO Chaney  PCP: Gail Gary MD  Admission Date:   Admission Orders (From admission, onward)       Ordered        04/14/25 1846  Place in Observation  Once                          Discharge Date: 04/15/25    Consultations During Hospital Stay:  Neurology    Procedures Performed:   None    Significant Findings / Test Results:   MRI brain w wo contrast   Final Result by Primo Virk MD (04/15 1329)      No acute intracranial abnormality.      Similar small round nonenhancing lesion in right posteromedial thalamus, suspicious for low-grade glioma.      Workstation performed: [id number]         CTA stroke alert (head/neck)   Final Result by Isidoro Day MD (04/14 1818)      No stenosis, dissection or occlusion of the carotid or vertebral arteries or major vessels of the [place].               Findings were directly discussed with Tay Bloom at 6:10 PM.      Workstation performed: [id number]         X-ray chest 1 view portable   ED Interpretation by Maddie Sparrow DO (04/14 1808)   NAD      Final Result by Shaq Taveras MD (04/15 2595)      No acute cardiopulmonary disease.            Workstation performed: [id number]         CT stroke alert brain   Final Result by Isidoro Day MD (04/14 1819)      Stable right thalamic lesion no evidence of acute vascular territorial infarction or intracranial hemorrhage.      Findings were  directly discussed with Tay Bloom at approximately 6:10 PM.      Workstation performed: [id number]               Incidental Findings:   As noted above  I reviewed the above mentioned incidental findings with the patient and/or family and they expressed understanding.    Test Results Pending at Discharge (will require follow up):   None     Outpatient Tests Requested:  None    Complications:  None    Reason for Admission:   Chief Complaint   Patient presents with    Dizziness     Pt reports Saturday morning he started with room spinning dizziness and nausea. Pt reports it went away yesterday then became intermittent last night. Now constant and can't open his eyes.          Hospital Course:   Vega Troy is a 35 y.o. male patient who originally presented to the hospital on 4/14/2025 due to dizziness.  Patient presented to ED with intermittent vertigo that lasted about 2 days.  However on the afternoon prior to admission he started to have severe vertigo which was associated with nausea vomiting and difficulty with ambulation.  Of note patient has a history of low-grade right thalamic neoplasm that does cause intermittent vertigo for this patient.  He was admitted under observation on stroke pathway.  Patient received aspirin and Plavix load.  He was started on atorvastatin.  Permissible hypertension was allowed for the first 24 hours. CT head showed no acute stroke, MRI was negative for acute stroke.  He was seen and evaluated by neurology.  Symptoms improved with IV fluids, Valium, as needed meclizine.  He was cleared by neurology for discharge.  Patient will continue to follow-up outpatient with neurosurgery for his brain lesion.  He remained hypertensive this admission.  Started him on low-dose lisinopril and advised him to follow-up with PCP within 7 days of discharge for blood pressure check and medication adjustment.  Patient was discharged stable without complaints.  He verbalized understanding of discharge  "and follow-up information.  A brief summary of patient's hospital course for further details please refer to associated progress notes.          Please see above list of diagnoses and related plan for additional information.     Condition at Discharge: good    Discharge Day Visit / Exam:   Subjective:  Patient seen and examined sitting up in chair. Reports dizziness has resolved. No acute events overnight, no complaints to offer. Eager for discharge home.   Vitals: Blood Pressure: (!) 177/125 (04/15/25 1143)  Pulse: 79 (04/15/25 1100)  Temperature: 97.9 °F (36.6 °C) (04/15/25 1100)  Temp Source: Oral (04/15/25 1100)  Respirations: 18 (04/15/25 1100)  Height: 6' 1\" (185.4 cm) (04/14/25 1941)  Weight - Scale: 118 kg (259 lb 12.8 oz) (04/14/25 1941)  SpO2: 95 % (04/15/25 1100)  Physical Exam  Vitals and nursing note reviewed.   Constitutional:       General: He is not in acute distress.     Appearance: He is well-developed. He is obese.   HENT:      Head: Normocephalic and atraumatic.      Mouth/Throat:      Mouth: Mucous membranes are moist.   Eyes:      Conjunctiva/sclera: Conjunctivae normal.   Cardiovascular:      Rate and Rhythm: Normal rate and regular rhythm.      Pulses: Normal pulses.      Heart sounds: Normal heart sounds. No murmur heard.  Pulmonary:      Effort: Pulmonary effort is normal. No respiratory distress.      Breath sounds: Normal breath sounds.   Abdominal:      General: Bowel sounds are normal. There is no distension.      Palpations: Abdomen is soft.      Tenderness: There is no abdominal tenderness.   Musculoskeletal:         General: No swelling.      Cervical back: Neck supple.   Skin:     General: Skin is warm and dry.      Capillary Refill: Capillary refill takes less than 2 seconds.   Neurological:      General: No focal deficit present.      Mental Status: He is alert and oriented to person, place, and time.      Motor: No weakness.   Psychiatric:         Mood and Affect: Mood normal.    "      Behavior: Behavior normal.          Discussion with Family: Updated  (wife) at bedside.    Discharge instructions/Information to patient and family:   See after visit summary for information provided to patient and family.      Provisions for Follow-Up Care:  See after visit summary for information related to follow-up care and any pertinent home health orders.      Mobility at time of Discharge:   Basic Mobility Inpatient Raw Score: 22  JH-HLM Goal: 7: Walk 25 feet or more  JH-HLM Achieved: 7: Walk 25 feet or more  HLM Goal achieved. Continue to encourage appropriate mobility.     Disposition:   Home    Planned Readmission: No    Discharge Medications:  See after visit summary for reconciled discharge medications provided to patient and/or family.      Administrative Statements   Discharge Statement:  I have spent a total time of 40 minutes in caring for this patient on the day of the visit/encounter. >30 minutes of time was spent on: Diagnostic results, Prognosis, Risks and benefits of tx options, Instructions for management, Patient and family education, Importance of tx compliance, Risk factor reductions, Impressions, Counseling / Coordination of care, Documenting in the medical record, Reviewing / ordering tests, medicine, procedures  , and Communicating with other healthcare professionals .    **Please Note: This note may have been constructed using a voice recognition system**

## 2025-04-15 NOTE — ASSESSMENT & PLAN NOTE
Stable 9mm R posteromedial thalamic lesion, non-specific   Continue outpatient monitoring with Neurosurgery

## 2025-04-15 NOTE — CONSULTS
"Consultation - Neurology   Name: Vega Troy 35 y.o. male I MRN: [id number]  Unit/Bed#: 2 E 268-01 I Date of Admission: 4/14/2025   Date of Service: 4/15/2025 I Hospital Day: 0   Inpatient consult to Neurology  Consult performed by: MARCELLO Foster  Consult ordered by: Gerald Devi MD    Physician Requesting Evaluation: Vitor Pereyra MD   Reason for Evaluation / Principal Problem: Intermittent vertigo, ambulatory dysfunction    Assessment & Plan  Vertigo  Patient is a 35 year old male with right thalamic lesion and R ICA non-ruptured aneurysm, anxiety, intractable migraine w/o aura, latent autoimmune diabetes, and tremor who presented to ED 4/14 evening as a stroke alert with intermittent vertigo since Saturday, which worsened yesterday with difficulty ambulating. Episode lasted for over 4 hours. Patient noted to have blood pressure 210/118 on arrival to ED. Given 1L NSS bolus, valium 5mg IV, reglan 10 mg IV, meclizine 50mg PO, magnesium 2g IV, tylenol 1000mg IV in ED, possible improvement as he was transported to med-surg floor. Neurology is consulted for intermittent vertigo in setting of intracranial lesion.     Workup:   Stroke alert  blood pressure 210/118 on arrival  CT head- \"Stable right thalamic lesion no evidence of acute vascular territorial infarction or intracranial hemorrhage.\"  CTA head and neck:  \"No stenosis, dissection or occlusion of the carotid or vertebral arteries or major vessels of the [place].\"  NIH 0 on arrival  Patient is not a TNK candidate given intracranial lesion and risk of hemorrhage  DAPT loading: asa 324mg & plavix 600mg, followed by plavix 75mg daily        MRI brain negative for acute infarct, \"Similar small round nonenhancing lesion in right posteromedial thalamus, suspicious for low-grade glioma. \"  Hba1c (4/15/25): 5.5  Lipids panel (4/15/25):   cholesterol 213  Triglycerides 272  HDL 38          Impression:   Etiology for vertigo likely " "peripheral vestibulopathy versus vestibular migraine,     Plan:  Can discontinue stroke pathway  Can discontinue echo  Can discontinue statin and plavix  Patient does not need outpatient neurology followup; can followup outpatient with ENT  Recommend PRN meclizine  Brain lesion  Stable 9mm R posteromedial thalamic lesion, non-specific   Continue outpatient monitoring with Neurosurgery    Hypertension  Patient is not maintained on an antihypertensive  Recommend starting antihypertensive  Mgmt per primary team      Vega Troy will not need outpatient follow up with Neurology. He will not require outpatient neurological testing.    History of Present Illness   Vega Troy is a 35 y.o. right handed male PMH of right thalamic lesion and R ICA non-ruptured aneurysm, anxiety, intractable migraine w/o aura, latent autoimmune diabetes, and tremor who presents with complaints of intermittent vertigo since Saturday, which occurred again yesterday with difficulty ambulating. Episode lasted for over 4 hours. Patient noted to have high blood pressure on arrival to ED- 210/118.     He reports that the first episode of spinning sensation occurred Saturday afternoon while he was laying on his right side leaning his head on his right hand, watching TV. He states it made him worry so he stood up and felt continued spinning sensation along with 'blurry, foggy vision', nausea, abdominal discomfort, vomiting, and diarrhea. He states he made it upstairs and spent most of the afternoon in the bathroom being ill. He notes while attempting to walk to the bathroom he was very unsteady, and 'kept bouncing into everything' including leaning on walls and 'crawled up the stairs'. His wife gave him 4mg zofran and \"2\" meclizine tablets, after which he laid down and took a nap. He notes he felt 'fine' after he woke up.     Yesterday at about 130p he states he was standing in the kitchen making lunch when he 'felt something' and another episode of " spinning started. He reports he took zofran and 2 meclizine tablets and laid down shortly after in an effort to abort the episode. He states lying down decreased the intensity of symptoms and notes the intensity was also less when he laid on his left side.     Today, he is sitting in recliner at bedside. He reports he feels great, and has had no additional episodes of vertigo since yesterday evening. He states he isn't sure what improved symptoms, and recalls lying in the gurney being transported to floor with his eyes closed when he noticed the symptoms subsiding. He states that he does have chronic, daily headaches at the back of his head and neck, that feels like throbbing and pressure. He thinks it might be an issue with his neck, and this might be causing the vertigo. He endorses periodic ringing in his ears since his diagnosis of a thalamic lesion last spring. He denies any new or different headaches, chest pain, SOB, numbness/ weakness/ tingling, fullness or pressure in ears, changes in hearing, vision, or speech, denies loss of consciousness, recent illness or head trauma. He denies any history of hypertension, but notes family history of same.     Review of Systems   HENT:  Negative for ear discharge, ear pain and hearing loss.    Eyes:  Negative for photophobia, pain, redness and visual disturbance.   Respiratory:  Negative for shortness of breath.    Cardiovascular:  Negative for chest pain.   Gastrointestinal:  Negative for abdominal pain, nausea and vomiting.   Neurological:  Positive for tremors and headaches. Negative for dizziness, syncope, speech difficulty, weakness and numbness.        Patient has baseline tremor since childhood without known cause. Patient endorses daily to near daily posterior headache/ neck pain   Psychiatric/Behavioral: Negative.        Vertigo    Medical History Review: I have reviewed the patient's PMH, PSH, Social History, Family History, Meds, and Allergies   Historical  Information   Past Medical History:   Diagnosis Date    Abdominal pain     Aneurysm (HCC)     Fusiform aneurysmal dilatation    C. difficile colitis     2011    Pilonidal cyst      Past Surgical History:   Procedure Laterality Date    COLONOSCOPY N/A 11/29/2016    Procedure: COLONOSCOPY;  Surgeon: Mono Engel MD;  Location: [facility];  Service:     OCCULT BLOOD, FECAL IMMUNOCHEMICAL (HISTORICAL)      LA EXCISION PILONIDAL CYST/SINUS SIMPLE N/A 8/28/2018    Procedure: EXCISION PILONIDAL CYST;  Surgeon: Daniel Aparicio DO;  Location: [facility];  Service: General     Social History     Tobacco Use    Smoking status: Former     Types: Cigarettes     Start date: 2018    Smokeless tobacco: Former     Types: Chew    Tobacco comments:     Nicotine packs   Vaping Use    Vaping status: Every Day    Start date: 2/1/2018    Substances: Nicotine, THC   Substance and Sexual Activity    Alcohol use: Not Currently     Comment: occ    Drug use: Yes     Types: Marijuana     Comment: Medical Marijuana Card    Sexual activity: Not on file     E-Cigarette/Vaping    E-Cigarette Use Current Every Day User     Start Date 2/1/18      E-Cigarette/Vaping Substances    Nicotine Yes     THC Yes     CBD No     Flavoring No     Other No     Unknown No      Family History   Problem Relation Age of Onset    Asthma Mother     Arthritis Mother     Heart failure Mother     Asthma Paternal Grandmother     Arthritis Paternal Grandmother     Alzheimer's disease Family      Social History     Tobacco Use    Smoking status: Former     Types: Cigarettes     Start date: 2018    Smokeless tobacco: Former     Types: Chew    Tobacco comments:     Nicotine packs   Vaping Use    Vaping status: Every Day    Start date: 2/1/2018    Substances: Nicotine, THC   Substance and Sexual Activity    Alcohol use: Not Currently     Comment: occ    Drug use: Yes     Types: Marijuana     Comment: Medical Marijuana Card    Sexual activity: Not on file       Current  Facility-Administered Medications:     acetaminophen (TYLENOL) tablet 650 mg, Q6H PRN    ALPRAZolam (XANAX) tablet 0.25 mg, BID PRN    atorvastatin (LIPITOR) tablet 40 mg, QPM    clopidogrel (PLAVIX) tablet 75 mg, Daily    enoxaparin (LOVENOX) subcutaneous injection 40 mg, Daily    meclizine (ANTIVERT) tablet 25 mg, Q8H PRN    sertraline (ZOLOFT) tablet 100 mg, Daily  Prior to Admission Medications   Prescriptions Last Dose Informant Patient Reported? Taking?   ALPRAZolam (XANAX) 0.25 mg tablet  Self No No   Sig: Take 1 tablet (0.25 mg total) by mouth daily at bedtime as needed for anxiety   Patient not taking: Reported on 10/22/2024   ALPRAZolam (XANAX) 0.25 mg tablet 4/14/2025 Morning  No Yes   Sig: Take 1 tablet (0.25 mg total) by mouth 2 (two) times a day as needed for anxiety   MAGNESIUM PO 4/14/2025 Self Yes Yes   Sig: Take 400 mg by mouth every morning   Omega-3 Fatty Acids (FISH OIL PO) 4/14/2025 Noon  Yes Yes   Sig: Take by mouth   sertraline (ZOLOFT) 100 mg tablet 4/14/2025 Morning  No Yes   Sig: Take 1 tablet (100 mg total) by mouth daily   sertraline (ZOLOFT) 100 mg tablet   No Yes   Sig: TAKE 1 TABLET BY MOUTH EVERY DAY      Facility-Administered Medications: None     Patient has no known allergies.    Objective :  Temp:  [97.4 °F (36.3 °C)-98.6 °F (37 °C)] 98.2 °F (36.8 °C)  HR:  [59-82] 71  BP: (131-210)/() 162/124  Resp:  [17-20] 18  SpO2:  [94 %-98 %] 95 %  O2 Device: None (Room air)    Physical Exam  Vitals and nursing note reviewed.   Constitutional:       General: He is not in acute distress.     Appearance: He is not ill-appearing.   HENT:      Head: Normocephalic and atraumatic.      Mouth/Throat:      Mouth: Mucous membranes are moist.   Eyes:      General: Lids are normal.      Extraocular Movements: No nystagmus.      Conjunctiva/sclera: Conjunctivae normal.   Pulmonary:      Effort: Pulmonary effort is normal. No respiratory distress.   Musculoskeletal:         General: Normal range  of motion.      Cervical back: Normal range of motion. No rigidity or tenderness.   Skin:     General: Skin is warm and dry.   Neurological:      Mental Status: He is oriented to person, place, and time.      Motor: No weakness.      Deep Tendon Reflexes:      Reflex Scores:       Bicep reflexes are 2+ on the right side and 2+ on the left side.       Brachioradialis reflexes are 2+ on the right side and 2+ on the left side.       Patellar reflexes are 2+ on the right side and 2+ on the left side.  Psychiatric:         Mood and Affect: Mood normal.         Speech: Speech normal.     Neurological Exam  Mental Status   Oriented to person, place, and time. Speech is normal. Follows complex commands. Attention and concentration are normal.    Cranial Nerves  CN II: Visual acuity is normal. Right normal visual field. Left normal visual field.  CN III, IV, VI: No nystagmus. Normal lids and orbits bilaterally.   Right pupil: Round. Reactive to light. Reactive to accommodation.   Left pupil: Round. Reactive to light. Reactive to accommodation.  CN V: Facial sensation is normal.  CN VII: Full and symmetric facial movement.  CN IX, X: Palate elevates symmetrically  CN XI: Shoulder shrug strength is normal.  CN XII: Tongue midline without atrophy or fasciculations.  HINTS exam: Catch up saccades to the right, no skewed deviation, no nystagmus  [place]: unable to elicit vertiginous symptoms or nystagmus bilaterally.    Motor    Baseline tremors, otherwise no new abnormal movement  5/5 BLE/BUE strength.    Sensory  Light touch is normal in upper and lower extremities.     Reflexes                                            Right                      Left  Brachioradialis                    2+                         2+  Biceps                                 2+                         2+  Patellar                                2+                         2+  No ankle clonus bilaterally.    Coordination  Right: Finger-to-nose  normal. Heel-to-shin normal.Left: Finger-to-nose normal. Heel-to-shin normal.    Gait    Patient able to ambulate from bedside chair to bed without difficulty.        Lab Results: I have reviewed the following results:CBC:   Results from last 7 days   Lab Units 04/14/25 2019 04/14/25 1755   WBC Thousand/uL  --  8.46   RBC Million/uL  --  4.84   HEMOGLOBIN g/dL  --  14.5   HEMATOCRIT %  --  43.2   MCV fL  --  89   PLATELETS Thousands/uL 184 173   , BMP/CMP:   Results from last 7 days   Lab Units 04/14/25 1755   SODIUM mmol/L 138   POTASSIUM mmol/L 3.9   CHLORIDE mmol/L 104   CO2 mmol/L 27   BUN mg/dL 12   CREATININE mg/dL 0.84   CALCIUM mg/dL 9.3   EGFR ml/min/1.73sq m 111   , HgBA1C:   Results from last 7 days   Lab Units 04/15/25  0518   HEMOGLOBIN A1C % 5.5   , Coagulation:   Results from last 7 days   Lab Units 04/14/25 1755   INR  0.92   , Lipid Profile:   Results from last 7 days   Lab Units 04/15/25  0518   HDL mg/dL 38*   LDL CALC mg/dL 121*   TRIGLYCERIDES mg/dL 272*     Recent Labs     04/14/25 1755 04/14/25 2019   WBC 8.46  --    HGB 14.5  --    HCT 43.2  --     184   SODIUM 138  --    K 3.9  --      --    CO2 27  --    BUN 12  --    CREATININE 0.84  --    GLUC 112  --      Imaging Results Review: I personally reviewed the following image studies/reports in PACS and discussed pertinent findings with attending neurologist: CT head, CTA head and neck, and MRI brain.    VTE Prophylaxis: Enoxaparin (Lovenox)

## 2025-04-15 NOTE — UTILIZATION REVIEW
Initial Clinical Review    Admission: Date/Time/Statement:   Admission Orders (From admission, onward)       Ordered        04/14/25 1846  Place in Observation  Once                          Orders Placed This Encounter   Procedures    Place in Observation     Standing Status:   Standing     Number of Occurrences:   1     Level of Care:   Med Surg [16]     ED Arrival Information       Expected   -    Arrival   4/14/2025 17:14    Acuity   Emergent              Means of arrival   Wheelchair    Escorted by   Family Member    Service   Hospitalist    Admission type   Emergency              Arrival complaint   Dizziness, Nausea             Chief Complaint   Patient presents with    Dizziness     Pt reports Saturday morning he started with room spinning dizziness and nausea. Pt reports it went away yesterday then became intermittent last night. Now constant and can't open his eyes.        Initial Presentation: 35 y.o. male ***    Anticipated Length of Stay/Certification Statement: ***    Date: ***   Day 2: ***    ED Treatment-Medication Administration from 04/14/2025 1706 to 04/14/2025 1935         Date/Time Order Dose Route Action     04/14/2025 1828 sodium chloride 0.9 % bolus 1,000 mL 1,000 mL Intravenous New Bag     04/14/2025 1818 diazepam (VALIUM) injection 5 mg 5 mg Intravenous Given     04/14/2025 1817 meclizine (ANTIVERT) tablet 50 mg 50 mg Oral Given     04/14/2025 1818 metoclopramide (REGLAN) injection 10 mg 10 mg Intravenous Given     04/14/2025 1836 magnesium sulfate 2 g/50 mL IVPB (premix) 2 g 2 g Intravenous New Bag     04/14/2025 1824 acetaminophen (Ofirmev) injection 1,000 mg 1,000 mg Intravenous New Bag     04/14/2025 1753 iohexol (OMNIPAQUE) 350 MG/ML injection (MULTI-DOSE) 85 mL 85 mL Intravenous Given     04/14/2025 1814 aspirin chewable tablet 324 mg 324 mg Oral Given     04/14/2025 1816 clopidogrel (PLAVIX) tablet 600 mg 600 mg Oral Given            Scheduled Medications:  atorvastatin, 40 mg, Oral,  QPM  clopidogrel, 75 mg, Oral, Daily  enoxaparin, 40 mg, Subcutaneous, Daily  sertraline, 100 mg, Oral, Daily      Continuous IV Infusions:     PRN Meds:  acetaminophen, 650 mg, Oral, Q6H PRN  ALPRAZolam, 0.25 mg, Oral, BID PRN  meclizine, 25 mg, Oral, Q8H PRN      ED Triage Vitals   Temperature Pulse Respirations Blood Pressure SpO2 Pain Score   04/14/25 1718 04/14/25 1718 04/14/25 1718 04/14/25 1718 04/14/25 1718 04/14/25 1941   97.8 °F (36.6 °C) 80 20 (!) 186/115 97 % No Pain     Weight (last 2 days)       Date/Time Weight    04/14/25 19:41:09 118 (259.8)    04/14/25 1757 121 (267.42)    04/14/25 1718 118 (260)            Vital Signs (last 3 days)       Date/Time Temp Pulse Resp BP MAP (mmHg) SpO2 O2 Device Patient Position - Orthostatic VS Fernando Coma Scale Score Pain    04/15/25 0643 98.2 °F (36.8 °C) 71 18 162/124 136 95 % None (Room air) Lying -- --    04/15/25 0640 -- -- -- -- -- -- -- -- -- 4    04/15/25 0600 98.2 °F (36.8 °C) 59 18 185/134 -- 94 % None (Room air) Lying 15 No Pain    04/15/25 0400 98.2 °F (36.8 °C) 68 18 167/110 129 94 % None (Room air) Lying 15 No Pain    04/15/25 0200 98.6 °F (37 °C) 82 18 154/106 122 94 % None (Room air) Lying 15 No Pain    04/15/25 0000 97.4 °F (36.3 °C) 64 18 134/95 108 95 % None (Room air) Lying 15 No Pain    04/14/25 2349 -- 64 -- -- -- 95 % -- -- -- --    04/14/25 2200 98.1 °F (36.7 °C) 65 18 144/89 107 97 % None (Room air) Lying 15 No Pain    04/14/25 2100 98 °F (36.7 °C) 61 19 131/94 106 96 % -- Lying 15 No Pain    04/14/25 2000 98 °F (36.7 °C) 76 18 163/113 86 96 % None (Room air) Lying 15 No Pain    04/14/25 19:41:09 98 °F (36.7 °C) 62 18 161/117 132 96 % None (Room air) Lying -- No Pain    04/14/25 1941 98 °F (36.7 °C) -- -- -- -- -- None (Room air) -- 15 No Pain    04/14/25 1900 -- 73 20 148/85 -- 96 % None (Room air) Lying 15 --    04/14/25 1845 97.8 °F (36.6 °C) 65 18 150/81 -- 97 % None (Room air) Lying 15 --    04/14/25 1830 97.8 °F (36.6 °C) 80 18  173/118 -- 97 % None (Room air) Lying 15 --    04/14/25 1824 -- -- -- 187/115 -- -- -- -- -- --    04/14/25 1815 97.8 °F (36.6 °C) 71 17 187/115 -- 98 % None (Room air) Lying 15 --    04/14/25 1800 97.8 °F (36.6 °C) 68 17 192/98 -- 97 % None (Room air) Lying 15 --    04/14/25 1745 -- 72 20 210/118 -- 98 % None (Room air) -- 15 --    04/14/25 1718 97.8 °F (36.6 °C) 80 20 186/115 143 97 % None (Room air) Sitting -- --              Pertinent Labs/Diagnostic Test Results:   Radiology:  CTA stroke alert (head/neck)   Final Interpretation by Isidoro Day MD (04/14 1818)      No stenosis, dissection or occlusion of the carotid or vertebral arteries or major vessels of the [place].               Findings were directly discussed with Tay Bloom at 6:10 PM.      Workstation performed: [id number]         X-ray chest 1 view portable   ED Interpretation by Maddie Sparrow DO (04/14 1808)   NAD      Final Interpretation by Shaq Taveras MD (04/15 0549)      No acute cardiopulmonary disease.            Workstation performed: [id number]         CT stroke alert brain   Final Interpretation by Isidoro Day MD (04/14 1819)      Stable right thalamic lesion no evidence of acute vascular territorial infarction or intracranial hemorrhage.      Findings were directly discussed with Tay Bloom at approximately 6:10 PM.      Workstation performed: [id number]         MRI Inpatient Order    (Results Pending)     Cardiology:  ECG 12 lead    by Interface, Ris Results In (04/14 1803)        GI:  No orders to display           Results from last 7 days   Lab Units 04/14/25  2019 04/14/25  1755   WBC Thousand/uL  --  8.46   HEMOGLOBIN g/dL  --  14.5   HEMATOCRIT %  --  43.2   PLATELETS Thousands/uL 184 173         Results from last 7 days   Lab Units 04/14/25  1755   SODIUM mmol/L 138   POTASSIUM mmol/L 3.9   CHLORIDE mmol/L 104   CO2 mmol/L 27   ANION GAP mmol/L 7   BUN mg/dL 12   CREATININE mg/dL 0.84   EGFR  "ml/min/1.73sq m 111   CALCIUM mg/dL 9.3         Results from last 7 days   Lab Units 04/14/25  1800   POC GLUCOSE mg/dl 110     Results from last 7 days   Lab Units 04/14/25  1755   GLUCOSE RANDOM mg/dL 112             No results found for: \"BETA-HYDROXYBUTYRATE\"                   Results from last 7 days   Lab Units 04/14/25  2219 04/14/25  2019 04/14/25  1755   HS TNI 0HR ng/L  --   --  3   HS TNI 2HR ng/L  --  4  --    HSTNI D2 ng/L  --  1  --    HS TNI 4HR ng/L 3  --   --    HSTNI D4 ng/L 0  --   --          Results from last 7 days   Lab Units 04/14/25  1755   PROTIME seconds 13.1   INR  0.92   PTT seconds 29                                                                                                               Past Medical History:   Diagnosis Date    Abdominal pain     Aneurysm (HCC)     Fusiform aneurysmal dilatation    C. difficile colitis     2011    Pilonidal cyst      Present on Admission:   Brain lesion   Vertigo   Class 2 obesity in adult   Hypertension      Admitting Diagnosis: Dizziness [R42]  Vertigo [R42]  Age/Sex: 35 y.o. male    Network Utilization Review Department  ATTENTION: Please call with any questions or concerns to [phone] and carefully listen to the prompts so that you are directed to the right person. All voicemails are confidential.   For Discharge needs, contact Care Management DC Support Team at [phone] opt. 2  Send all requests for admission clinical reviews, approved or denied determinations and any other requests to dedicated fax number below belonging to the campus where the patient is receiving treatment. List of dedicated fax numbers for the Facilities:  FACILITY NAME UR FAX NUMBER   ADMISSION DENIALS (Administrative/Medical Necessity) [phone]   DISCHARGE SUPPORT TEAM (NETWORK) [phone]   PARENT CHILD HEALTH (Maternity/NICU/Pediatrics) [phone]   [facility] [phone]   [facility] [phone] "   [facility] [phone]   [facility] [phone]   [facility] [phone]   [facility] [phone]   [facility] [phone]   [facility] [phone]   [facility] [phone]   [facility] [phone]   [facility] [phone]   [facility] ([place], [place]) [phone]

## 2025-04-15 NOTE — ASSESSMENT & PLAN NOTE
Patient is not maintained on an antihypertensive  Recommend starting antihypertensive  Mgmt per primary team

## 2025-04-15 NOTE — ASSESSMENT & PLAN NOTE
Last MRI on 8/30/2024 showed stable size of the T2 hyperintense 9 mm right thalamic lesion. There is the suggestion of minimal peripheral enhancement on thin section postcontrast images. No nodular enhancement seen. As discussed previously, a low-grade glial neoplasm is suspected. A prominent perivascular space is a less likely at consideration. Subacute to chronic lacunar type infarct could potentially appear similar.   MRI 4/15:   No acute intracranial abnormality.Similar small round nonenhancing lesion in right posteromedial thalamus, suspicious for low-grade glioma  Continue outpatient monitoring with Neurosurgery

## 2025-04-15 NOTE — CASE MANAGEMENT
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  [facility] [address]  Phone: [phone] Fax: [phone]    [facility]  [place], [place] - [address]  [address]  [place] [address]  Phone: [phone] Fax: [phone]    Primary Care Provider: Gail Gary MD    Primary Insurance: [place] PEND[place]  Secondary Insurance:     ASSESSMENT:  Active Health Care Proxies    There are no active Health Care Proxies on file.       Advance Directives  Does patient have a Health Care POA?: No  Was patient offered paperwork?: Yes (provided)  Does patient currently have a Health Care decision maker?: No  Does patient have Advance Directives?: No  Was patient offered paperwork?: Yes (provided)  Primary Contact: Chau Troy (spouse) [phone]    Readmission Root Cause  30 Day Readmission: No    Patient Information  Admitted from:: Home  Mental Status: Alert  During Assessment patient was accompanied by: Spouse  Assessment information  provided by:: Patient  Primary Caregiver: Self  Support Systems: Self, Spouse/significant other, Family members  County of Residence: [place]  [facility] do you live in?: [place]  Living Arrangements: Lives w/ Spouse/significant other    Activities of Daily Living Prior to Admission  Functional Status: Independent  Completes ADLs independently?: Yes  Ambulates independently?: Yes  Does patient use assisted devices?: No  Does patient currently own DME?: No  Does patient currently have HHC?: No    Patient Information Continued  Does patient have prescription coverage?: No  Can the patient afford their medications and any related supplies (such as glucometers or test strips)?: Yes (Uses discount cards at [facility].  States he is able to get his medications.)  Does patient receive dialysis treatments?: No  Does patient have a history of substance abuse?: No  Does patient have a history of Mental Health Diagnosis?: Yes (Anxiety)  Is patient receiving treatment for mental health?: Yes (medication management)    Means of Transportation  Means of Transport to Appts:: Drives Self          DISCHARGE DETAILS:    Discharge planning discussed with:: Patient & spouse at bedside.  Freedom of Choice: Yes  Comments - Freedom of Choice: FOC maintained - CM introduced self and role.  Reviewed DCP.  Patient cleared for d/c home today w/ no needs.  IPTA.  AMPAC is 22.  Patient confirms he is uninsured, states he was seen by financial counselors this stay.  Currently listed as MA Pending.  Reviewed Advanced Directives, packets provided.  Patient encouraged to bring copy to PCP's office once completed so document can be scanned into chart.  Patient is established with a PCP.  CM contacted family/caregiver?: Yes  Were Treatment Team discharge recommendations reviewed with patient/caregiver?: Yes  Did patient/caregiver verbalize understanding of patient care needs?: Yes  Were patient/caregiver advised of the risks associated with not following  Treatment Team discharge recommendations?: Yes    Contacts  Patient Contacts: Chau (spouse)  Relationship to Patient:: Family  Contact Method: In Person  Reason/Outcome: Continuity of Care, Discharge Planning    Requested Home Health Care         Is the patient interested in HHC at discharge?: No    DME Referral Provided  Referral made for DME?: No    Other Referral/Resources/Interventions Provided:  Interventions: Advanced Directives  Referral Comments: See FOC.    Treatment Team Recommendation: Home  Discharge Destination Plan:: Home  Transport at Discharge : Family

## 2025-04-15 NOTE — ASSESSMENT & PLAN NOTE
Patient reported he had a bout of vertigo lasting hours 2 days ago and then it resolved.  He was fine until yesterday afternoon that began having intermittent bouts of vertigo that resolved in between.  He again was fine today until around 1:30 PM he began having severe vertigo associated with nausea/vomiting and being unable to ambulate this remained persistent and came to the ED. On exam, patient does not have any nystagmus and other cerebellar signs cranial nerves are intact  Admitted on stroke pathway, stroke now ruled out  Neurology consulted  CT head and neck showed no stenosis, dissection or occlusion of the carotid or vertebral arteries or major vessels of [place].  MRI negative for acute stroke  Received plavix, ASA load, started statin  Plavix now discontinued  Symptoms improved with IV Mag, Valium 5mg, Reglan  Meclizine every 8 hours as needed  Will initiate low dose antihypertensive as BP remains elevated this admission  Patient stable for discharge, will follow-up with PCP outpatient

## 2025-04-15 NOTE — DISCHARGE INSTR - AVS FIRST PAGE
Dear Vega Troy,     It was our pleasure to care for you here at [facility]. For follow up as well as any medication refills, we recommend that you follow up with your primary care physician. Here are the most important instructions/ recommendations at discharge:     Notable Medication Adjustments -   Start Lisinopril 2.5mg for your blood pressure, follow-up with PCP within 1 week for blood pressure check  Start atorvastatin 20mg daily for high cholesterol  Take Meclizine 25mg every 8 hours as needed for dizziness  Take Zofran 4mg as needed every 6 hours for nausea  Testing Required after Discharge - ** Please contact your PCP to request testing orders for any of the testing recommended here **  None  Important follow up information -   Follow-up with your PCP within 7 days of discharge  Make an appointment to follow-up with Neurosurgery  Other Instructions -   Check your blood pressure daily  Adhere to a low fat, low cholesterol diet  Please review this entire after visit summary as additional general instructions including medication list, appointments, activity, diet, any pertinent wound care, and other additional recommendations from your care team that may be provided for you.      Sincerely,     MARCELLO Chaney

## 2025-04-15 NOTE — ASSESSMENT & PLAN NOTE
Bp elevated this admission, not on antihypertensive outpatient  Will start low dose lisinopril  Patient to f/u with PCP for BP check and medication adjust as indicated

## 2025-04-15 NOTE — SPEECH THERAPY NOTE
Speech-Language Pathology Bedside Swallow Evaluation      Patient Name: Vega Troy    Today's Date: 4/15/2025     Problem List  Active Problems:    Class 2 obesity in adult    Brain lesion    Vertigo    Hypertension      Past Medical History  Past Medical History:   Diagnosis Date    Abdominal pain     Aneurysm (HCC)     Fusiform aneurysmal dilatation    C. difficile colitis     2011    Pilonidal cyst        Past Surgical History  Past Surgical History:   Procedure Laterality Date    COLONOSCOPY N/A 11/29/2016    Procedure: COLONOSCOPY;  Surgeon: Mono Engel MD;  Location:  GI LAB;  Service:     OCCULT BLOOD, FECAL IMMUNOCHEMICAL (HISTORICAL)      [place] EXCISION PILONIDAL CYST/SINUS SIMPLE N/A 8/28/2018    Procedure: EXCISION PILONIDAL CYST;  Surgeon: Daniel Aparicio DO;  [facility]: [facility];  Service: General       Summary/ Impressions   Pt presented with functional oropharyngeal swallow upon completion of bedside swallow evaluation. Pt denied concerns regarding swallowing with meals or medication. Pt presented with adequate oral control and oral processing. Mastication appeared timely and complete. A-P transfer appeared timely. No oral retention noted. Initiation of swallow appeared prompt upon laryngeal palpation. Vocal quality remained dry and clear throughout trials. Pt remained comfortable on RA. No overt s/s of aspiration or distress noted at any time.       Recommended Diet: regular diet and thin liquids   Recommended Form of Meds: whole with liquid   Supervision: tray set up with assistance   Oral Care: x2-4/day  Aspiration precautions and swallowing strategies: upright posture, slow rate of feeding, small bites/sips, and alternating bites and sips  Other Recommendations: No further ST follow-up needed in acute-care setting. Please re-order should changes or difficulties arise.       Current Medical Status  Pt is a 35 y.o. male who presented to [facility] with a PMH of low-grade right thalamic  neoplasm presented with intermittent than constant vertigo.  Patient reported he had a bout of vertigo lasting hours 2 days ago and then it resolved. He was fine until yesterday afternoon that began having intermittent bouts of vertigo that resolved in between. He again was fine today until around 1:30 PM he began having severe vertigo associated with nausea/vomiting and being unable to ambulate this remained persistent and came to the ED.  Pt with known R-thalamic lesion, low-grade glial neoplasm (MRI 8/2024- results below), per medical chart.     Past medical history:  Please see H&P for details    Special Studies:  MRI: Pending     CTA Head/Neck: 4/14/2025   IMPRESSION:  No stenosis, dissection or occlusion of the carotid or vertebral arteries or major vessels of the [place].    Chest X-Ray: 4/14/2025  IMPRESSION:  No acute cardiopulmonary disease.    MRI: 8/30/2024  IMPRESSION:  1. Stable size of the T2 hyperintense 9 mm right thalamic lesion. There is the suggestion of minimal peripheral enhancement on thin section postcontrast images. No nodular enhancement seen. As discussed previously, a low-grade glial neoplasm is   suspected. A prominent perivascular space is a less likely at consideration. Subacute to chronic lacunar type infarct could potentially appear similar. Recommend continued close follow-up.  2. No new abnormality.    Social/Education/Vocational Hx:  Pt lives with family    Swallow Information   Current Risks for Dysphagia & Aspiration:  R-thalamic lesion, low-grade glial neoplasm  Current Symptoms/Concerns:   R-thalamic lesion, low-grade glial neoplasm  Current Diet: regular diet and thin liquids   Baseline Diet: regular diet and thin liquids      Baseline Assessment   Behavior/Cognition: alert  Speech/Language Status: able to participate in conversation and able to follow commands  Patient Positioning: upright in bed  Pain Status/Interventions/Response to Interventions: No report of or  nonverbal indications of pain.       Swallow Mechanism Exam  Facial: symmetrical  Labial: WFL  Lingual: WFL  Velum: unable to visualize  Mandible: adequate ROM  Dentition: adequate  Vocal quality:clear/adequate   Volitional Cough: strong/productive   Respiratory Status: on RA         Consistencies Assessed and Performance   Consistencies Administered: thin liquids, puree, and hard solids  Materials administered included water, applesauce, and saltine crackers     Oral Stage:  Pt presents with adequate oral control and timely oral processing. Labial seal and anterior bite adequate for bolus retrieval. Mastication appears timely and complete. A-P transfer appears timely with no oral residue remaining.     Pharyngeal Stage:   Initiation of swallow appears prompt upon laryngeal palpation. Vocal quality remains dry/clear. Pt remains comfortable on RA. No overt s/s of aspiration or distress at any time.     Esophageal Concerns: none reported      Summary and Recommendations (see above)    Results Reviewed with: patient and MD     No further ST follow-up needed in acute-care setting. Please re-order should changes or difficulties arise.

## 2025-04-15 NOTE — PHYSICAL THERAPY NOTE
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 difficile colitis     2011    Pilonidal cyst        Past Surgical History  Past Surgical History:   Procedure Laterality Date    COLONOSCOPY N/A 11/29/2016    Procedure: COLONOSCOPY;  Surgeon: Mono Engel MD;  [facility];  Service:     OCCULT BLOOD, FECAL IMMUNOCHEMICAL (HISTORICAL)      [place] EXCISION PILONIDAL CYST/SINUS SIMPLE N/A 8/28/2018    Procedure: EXCISION PILONIDAL CYST;  Surgeon: Daniel Aparicio DO;  Location: [facility];  Service: General          04/15/25 1204   PT Last Visit   PT Visit Date 04/15/25   Note Type   Note type Cancelled Session   Additional Comments PT order received. Chart review performed. At this time, PT evaluation cancelled due to pt being unavailable, off the floor at MRI. PT will continue to follow and evaluate as patient is medically appropriate for skilled PT interventions.           Terri Lux, PT

## 2025-04-15 NOTE — PLAN OF CARE
Problem: NEUROSENSORY - ADULT  Goal: Achieves stable or improved neurological status  Description: INTERVENTIONS- Monitor and report changes in neurological status- Monitor vital signs such as temperature, blood pressure, glucose, and any other labs ordered - Initiate measures to prevent increased intracranial pressure- Monitor for seizure activity and implement precautions if appropriate    Outcome: Progressing     Problem: NEUROSENSORY - ADULT  Goal: Achieves maximal functionality and self care  Description: INTERVENTIONS- Monitor swallowing and airway patency with patient fatigue and changes in neurological status- Encourage and assist patient to increase activity and self care. - Encourage visually impaired, hearing impaired and aphasic patients to use assistive/communication devices  Outcome: Progressing     Problem: PAIN - ADULT  Goal: Verbalizes/displays adequate comfort level or baseline comfort level  Description: Interventions:- Encourage patient to monitor pain and request assistance- Assess pain using appropriate pain scale- Administer analgesics based on type and severity of pain and evaluate response- Implement non-pharmacological measures as appropriate and evaluate response- Consider cultural and social influences on pain and pain management- Notify physician/advanced practitioner if interventions unsuccessful or patient reports new pain  Outcome: Progressing     Problem: SAFETY ADULT  Goal: Patient will remain free of falls  Description: INTERVENTIONS:- Educate patient/family on patient safety including physical limitations- Instruct patient to call for assistance with activity - Consult OT/PT to assist with strengthening/mobility - Keep Call bell within reach- Keep bed low and locked with side rails adjusted as appropriate- Keep care items and personal belongings within reach- Initiate and maintain comfort rounds- Make Fall Risk Sign visible to staff- Offer Toileting every 2 Hours, in advance of  need-Obtain necessary fall risk management equipment: call bell within reach- Apply yellow socks and bracelet for high fall risk patients- Consider moving patient to room near nurses station  Outcome: Progressing     Problem: DISCHARGE PLANNING  Goal: Discharge to home or other facility with appropriate resources  Description: INTERVENTIONS:- Identify barriers to discharge w/patient and caregiver- Arrange for needed discharge resources and transportation as appropriate- Identify discharge learning needs (meds, wound care, etc.)- Arrange for interpretive services to assist at discharge as needed- Refer to Case Management Department for coordinating discharge planning if the patient needs post-hospital services based on physician/advanced practitioner order or complex needs related to functional status, cognitive ability, or social support system  Outcome: Progressing     Problem: Knowledge Deficit  Goal: Patient/family/caregiver demonstrates understanding of disease process, treatment plan, medications, and discharge instructions  Description: Complete learning assessment and assess knowledge base.Interventions:- Provide teaching at level of understanding- Provide teaching via preferred learning methods  Outcome: Progressing

## 2025-04-15 NOTE — PLAN OF CARE
Problem: NEUROSENSORY - ADULT  Goal: Achieves stable or improved neurological status  Description: INTERVENTIONS- Monitor and report changes in neurological status- Monitor vital signs such as temperature, blood pressure, glucose, and any other labs ordered - Initiate measures to prevent increased intracranial pressure- Monitor for seizure activity and implement precautions if appropriate    Outcome: Progressing  Goal: Achieves maximal functionality and self care  Description: INTERVENTIONS- Monitor swallowing and airway patency with patient fatigue and changes in neurological status- Encourage and assist patient to increase activity and self care. - Encourage visually impaired, hearing impaired and aphasic patients to use assistive/communication devices  Outcome: Progressing     Problem: PAIN - ADULT  Goal: Verbalizes/displays adequate comfort level or baseline comfort level  Description: Interventions:- Encourage patient to monitor pain and request assistance- Assess pain using appropriate pain scale- Administer analgesics based on type and severity of pain and evaluate response- Implement non-pharmacological measures as appropriate and evaluate response- Consider cultural and social influences on pain and pain management- Notify physician/advanced practitioner if interventions unsuccessful or patient reports new pain  Outcome: Progressing     Problem: SAFETY ADULT  Goal: Patient will remain free of falls  Description: INTERVENTIONS:- Educate patient/family on patient safety including physical limitations- Instruct patient to call for assistance with activity - Consult OT/PT to assist with strengthening/mobility - Keep Call bell within reach- Keep bed low and locked with side rails adjusted as appropriate- Keep care items and personal belongings within reach- Initiate and maintain comfort rounds- Make Fall Risk Sign visible to staff- Offer Toileting every 2 Hours, in advance of need- Initiate/Maintain alarm-  Obtain necessary fall risk management equipment: - Apply yellow socks and bracelet for high fall risk patients- Consider moving patient to room near nurses station  Outcome: Progressing     Problem: DISCHARGE PLANNING  Goal: Discharge to home or other facility with appropriate resources  Description: INTERVENTIONS:- Identify barriers to discharge w/patient and caregiver- Arrange for needed discharge resources and transportation as appropriate- Identify discharge learning needs (meds, wound care, etc.)- Arrange for interpretive services to assist at discharge as needed- Refer to Case Management Department for coordinating discharge planning if the patient needs post-hospital services based on physician/advanced practitioner order or complex needs related to functional status, cognitive ability, or social support system  Outcome: Progressing     Problem: Knowledge Deficit  Goal: Patient/family/caregiver demonstrates understanding of disease process, treatment plan, medications, and discharge instructions  Description: Complete learning assessment and assess knowledge base.Interventions:- Provide teaching at level of understanding- Provide teaching via preferred learning methods  Outcome: Progressing

## 2025-04-23 ENCOUNTER — APPOINTMENT (OUTPATIENT)
Dept: URGENT CARE | Facility: MEDICAL CENTER | Age: 35
End: 2025-04-23

## 2025-07-07 DIAGNOSIS — F41.9 ANXIETY: ICD-10-CM

## 2025-07-08 RX ORDER — SERTRALINE HYDROCHLORIDE 100 MG/1
100 TABLET, FILM COATED ORAL DAILY
Qty: 90 TABLET | Refills: 0 | Status: SHIPPED | OUTPATIENT
Start: 2025-07-08

## (undated) DEVICE — GAUZE SPONGES,16 PLY: Brand: CURITY

## (undated) DEVICE — BETHLEHEM UNIVERSAL MINOR GEN: Brand: CARDINAL HEALTH

## (undated) DEVICE — INTENDED FOR TISSUE SEPARATION, AND OTHER PROCEDURES THAT REQUIRE A SHARP SURGICAL BLADE TO PUNCTURE OR CUT.: Brand: BARD-PARKER SAFETY BLADES SIZE 15, STERILE

## (undated) DEVICE — PENROSE DRAIN, 18 X 3 8: Brand: CARDINAL HEALTH

## (undated) DEVICE — 2000CC GUARDIAN II: Brand: GUARDIAN

## (undated) DEVICE — ADHESIVE SKN CLSR HISTOACRYL FLEX 0.5ML LF

## (undated) DEVICE — POOLE SUCTION HANDLE: Brand: CARDINAL HEALTH

## (undated) DEVICE — IV CATH INTROCAN 18G X 1 1/4 SAFETY

## (undated) DEVICE — SPONGE STICK WITH PVP-I: Brand: KENDALL

## (undated) DEVICE — PLUMEPEN PRO 10FT

## (undated) DEVICE — 3000CC GUARDIAN II: Brand: GUARDIAN

## (undated) DEVICE — SUT ETHILON 3-0 FSLX 30 IN 1673H

## (undated) DEVICE — TUBING SUCTION 5MM X 12 FT

## (undated) DEVICE — GLOVE SRG BIOGEL ORTHOPEDIC 8

## (undated) DEVICE — NEEDLE 22 G X 1 1/2 SAFETY

## (undated) DEVICE — UNDYED BRAIDED (POLYGLACTIN 910), SYNTHETIC ABSORBABLE SUTURE: Brand: COATED VICRYL

## (undated) DEVICE — SUT VICRYL 2-0 SH 27 IN UNDYED J417H

## (undated) DEVICE — REM POLYHESIVE ADULT PATIENT RETURN ELECTRODE: Brand: VALLEYLAB